# Patient Record
Sex: FEMALE | Race: WHITE | NOT HISPANIC OR LATINO | URBAN - METROPOLITAN AREA
[De-identification: names, ages, dates, MRNs, and addresses within clinical notes are randomized per-mention and may not be internally consistent; named-entity substitution may affect disease eponyms.]

---

## 2017-07-29 PROBLEM — Z00.00 ENCOUNTER FOR PREVENTIVE HEALTH EXAMINATION: Status: ACTIVE | Noted: 2017-07-29

## 2017-07-31 ENCOUNTER — OUTPATIENT (OUTPATIENT)
Dept: OUTPATIENT SERVICES | Facility: HOSPITAL | Age: 82
LOS: 1 days | End: 2017-07-31
Payer: COMMERCIAL

## 2017-07-31 ENCOUNTER — APPOINTMENT (OUTPATIENT)
Dept: MRI IMAGING | Facility: CLINIC | Age: 82
End: 2017-07-31
Payer: MEDICARE

## 2017-07-31 DIAGNOSIS — M79.604 PAIN IN RIGHT LEG: ICD-10-CM

## 2017-07-31 PROCEDURE — 73721 MRI JNT OF LWR EXTRE W/O DYE: CPT

## 2017-07-31 PROCEDURE — 73721 MRI JNT OF LWR EXTRE W/O DYE: CPT | Mod: 26,RT

## 2018-11-23 ENCOUNTER — OUTPATIENT (OUTPATIENT)
Dept: OUTPATIENT SERVICES | Facility: HOSPITAL | Age: 83
LOS: 1 days | End: 2018-11-23
Payer: COMMERCIAL

## 2018-11-23 ENCOUNTER — APPOINTMENT (OUTPATIENT)
Dept: ULTRASOUND IMAGING | Facility: IMAGING CENTER | Age: 83
End: 2018-11-23
Payer: MEDICARE

## 2018-11-23 DIAGNOSIS — Z00.8 ENCOUNTER FOR OTHER GENERAL EXAMINATION: ICD-10-CM

## 2018-11-23 PROCEDURE — 76770 US EXAM ABDO BACK WALL COMP: CPT | Mod: 26

## 2018-11-23 PROCEDURE — 76770 US EXAM ABDO BACK WALL COMP: CPT

## 2018-12-13 ENCOUNTER — APPOINTMENT (OUTPATIENT)
Dept: GERIATRICS | Facility: CLINIC | Age: 83
End: 2018-12-13
Payer: MEDICARE

## 2018-12-13 VITALS
RESPIRATION RATE: 16 BRPM | DIASTOLIC BLOOD PRESSURE: 58 MMHG | HEIGHT: 61 IN | OXYGEN SATURATION: 99 % | SYSTOLIC BLOOD PRESSURE: 130 MMHG | HEART RATE: 76 BPM

## 2018-12-13 VITALS — BODY MASS INDEX: 22.11 KG/M2 | WEIGHT: 117 LBS

## 2018-12-13 VITALS — TEMPERATURE: 98.2 F

## 2018-12-13 DIAGNOSIS — Z78.9 OTHER SPECIFIED HEALTH STATUS: ICD-10-CM

## 2018-12-13 DIAGNOSIS — Z60.2 PROBLEMS RELATED TO LIVING ALONE: ICD-10-CM

## 2018-12-13 DIAGNOSIS — Z63.4 DISAPPEARANCE AND DEATH OF FAMILY MEMBER: ICD-10-CM

## 2018-12-13 DIAGNOSIS — Z84.89 FAMILY HISTORY OF OTHER SPECIFIED CONDITIONS: ICD-10-CM

## 2018-12-13 PROCEDURE — 99205 OFFICE O/P NEW HI 60 MIN: CPT

## 2018-12-13 SDOH — SOCIAL STABILITY - SOCIAL INSECURITY: DISSAPEARANCE AND DEATH OF FAMILY MEMBER: Z63.4

## 2018-12-13 SDOH — SOCIAL STABILITY - SOCIAL INSECURITY: PROBLEMS RELATED TO LIVING ALONE: Z60.2

## 2019-01-24 ENCOUNTER — APPOINTMENT (OUTPATIENT)
Dept: ENDOCRINOLOGY | Facility: CLINIC | Age: 84
End: 2019-01-24

## 2019-01-25 ENCOUNTER — APPOINTMENT (OUTPATIENT)
Dept: GERIATRICS | Facility: CLINIC | Age: 84
End: 2019-01-25
Payer: MEDICARE

## 2019-01-25 VITALS
DIASTOLIC BLOOD PRESSURE: 50 MMHG | BODY MASS INDEX: 22.48 KG/M2 | WEIGHT: 119 LBS | SYSTOLIC BLOOD PRESSURE: 132 MMHG | HEART RATE: 90 BPM | TEMPERATURE: 97.6 F | OXYGEN SATURATION: 99 %

## 2019-01-25 DIAGNOSIS — G56.00 CARPAL TUNNEL SYNDROME, UNSPECIFIED UPPER LIMB: ICD-10-CM

## 2019-01-25 PROCEDURE — 99214 OFFICE O/P EST MOD 30 MIN: CPT

## 2019-01-25 RX ORDER — BLOOD SUGAR DIAGNOSTIC
STRIP MISCELLANEOUS
Qty: 100 | Refills: 0 | Status: DISCONTINUED | COMMUNITY
Start: 2018-06-19 | End: 2019-01-25

## 2019-01-25 RX ORDER — PEN NEEDLE, DIABETIC 32 GX 1/6"
32G X 4 MM NEEDLE, DISPOSABLE MISCELLANEOUS
Qty: 100 | Refills: 0 | Status: DISCONTINUED | COMMUNITY
Start: 2018-07-17 | End: 2019-01-25

## 2019-01-25 RX ORDER — LANCETS 28 GAUGE
EACH MISCELLANEOUS
Qty: 100 | Refills: 0 | Status: DISCONTINUED | COMMUNITY
Start: 2018-08-17 | End: 2019-01-25

## 2019-01-25 RX ORDER — CHLORTHALIDONE 25 MG/1
25 TABLET ORAL
Qty: 30 | Refills: 0 | Status: DISCONTINUED | COMMUNITY
Start: 2017-11-14 | End: 2019-01-25

## 2019-01-25 RX ORDER — GLIPIZIDE 5 MG/1
5 TABLET ORAL
Qty: 60 | Refills: 0 | Status: DISCONTINUED | COMMUNITY
Start: 2018-07-17 | End: 2019-01-25

## 2019-01-25 RX ORDER — INSULIN GLARGINE 100 [IU]/ML
100 INJECTION, SOLUTION SUBCUTANEOUS
Qty: 15 | Refills: 0 | Status: DISCONTINUED | COMMUNITY
Start: 2018-07-17 | End: 2019-01-25

## 2019-01-25 NOTE — REVIEW OF SYSTEMS
[Eyesight Problems] : eyesight problems [Loss Of Hearing] : hearing loss [As Noted in HPI] : as noted in HPI [Negative] : Heme/Lymph

## 2019-01-28 NOTE — DATA REVIEWED
[FreeTextEntry1] : EXAM:  US KIDNEYS AND BLADDER     PROCEDURE DATE:  11/23/2018       INTERPRETATION:  CLINICAL INFORMATION: Renal cysts. Follow-up.  COMPARISON: CT 3/22/2012.  TECHNIQUE: Sonography of the kidneys and bladder.   FINDINGS:  Right kidney:  9.6 cm. No renal mass, hydronephrosis or calculi.  Left kidney:  9.0 cm. No renal mass, hydronephrosis or calculi. 2 cm  simple cyst in the midpole.  Urinary bladder: Within normal limits.  IMPRESSION:   Normal renal ultrasound.\par \par EXAM:  MRI HIP WO CON RT     PROCEDURE DATE:  07/31/2017       INTERPRETATION:  EXAMINATION: MRI of the bony pelvis/right hip without  contrast  CLINICAL INFORMATION: Right leg pain  TECHNIQUE: Multiplanar, multisequential MR imaging was performed.  FINDINGS: There is no fracture or osteonecrosis. There is a physiologic  amount of bilateral hip joint fluid. There are no advanced arthritic  changes at the hips bilaterally.   There is full-thickness tearing of the anterior half of the right gluteus  medius tendon from its insertion on the right greater trochanter with  approximately 2 cm of tendon retraction. There is adjacent fluid in the  right subgluteus medius bursa communicating with fluid in the right  trochanteric bursa. There is adjacent intramuscular edema within the  distal gluteus medius and minimus muscles. There is mild insertional  gluteus minimus tendinosis on the right.  There is peritendinous edema at the left gluteus medius tendon insertion  and gluteus minimus tendon insertion without full-thickness tendon tear.  There is a small amount fluid within the left trochanteric bursa,  consistent with bursitis.  There is diverticulosis.  There are bilateral fat-containing inguinal  hernias.  IMPRESSION: Full-thickness tear of the anterior half of the right gluteus  medius tendon from its insertion on the greater trochanter with  retraction and with adjacent fluid in the right subgluteus medius and  trochanteric bursae.  Peritendinous edema at the left gluteus minimus and medius tendon  insertions with adjacent small amount of fluid within the left  trochanteric bursa.\par \par \par

## 2019-01-28 NOTE — PHYSICAL EXAM
[General Appearance - Alert] : alert [General Appearance - In No Acute Distress] : in no acute distress [Sclera] : the sclera and conjunctiva were normal [Extraocular Movements] : extraocular movements were intact [Hearing Threshold Finger Rub Not [place]] : hearing was normal [Oropharynx] : The oropharynx was normal [Neck Appearance] : the appearance of the neck was normal [Neck Cervical Mass (___cm)] : no neck mass was observed [Jugular Venous Distention Increased] : there was no jugular-venous distention [Thyroid Diffuse Enlargement] : the thyroid was not enlarged [Thyroid Nodule] : there were no palpable thyroid nodules [Auscultation Breath Sounds / Voice Sounds] : lungs were clear to auscultation bilaterally [Heart Rate And Rhythm] : heart rate was normal and rhythm regular [Heart Sounds] : normal S1 and S2 [Heart Sounds Gallop] : no gallops [Murmurs] : no murmurs [Heart Sounds Pericardial Friction Rub] : no pericardial rub [Bowel Sounds] : normal bowel sounds [Abdomen Soft] : soft [Abdomen Tenderness] : non-tender [] : no hepato-splenomegaly [Abdomen Mass (___ Cm)] : no abdominal mass palpated [Nail Clubbing] : no clubbing  or cyanosis of the fingernails [Involuntary Movements] : no involuntary movements were seen [No Focal Deficits] : no focal deficits [FreeTextEntry1] : decreased sensation to b/l hands on fingertips.  4- on bilateral  strength.  unable to perform rhomberg test due to instability/loss of balance standing.  slow to rise from chair, needs to use arms for assistance.

## 2019-01-28 NOTE — SOCIAL HISTORY
[No falls in past year] : Patient reported no falls in the past year [Canes] : kenneth [de-identified] : dtr

## 2019-01-28 NOTE — ASSESSMENT
[FreeTextEntry1] : labs from april 2017:\par cr 1.91 bun 53\par hbg 11.2\par \par 10/2018 cr 1.55, 6/2018 1.61\par ca and na, K wnl\par lfts from 5/2018: wnl\par chol 203\par \par moderate dementia/ memory loss: MMSE 14/30with serial 7's and 17/30 with world.  abnormal clock drawing. cardiovascular risk factors and timeline of reported memory loss by dtr more consistent with vascular dementia. reports never having brain imaging in past.  depression screening negative. start trial of Aricept 5mg daily. \par labs reviewed, negative for reversible causes.    discussed socialization importance, physical activity.  recommended dtr supervise medications in the pill box for adherence.\par \par neuropathy:   c/w PT/OT. possible carpel tunnel?  reports unclear if gabapentin is providing relief, consider trial off. \par \par dm2: episodes of hypoglycemia, with a1c of 5.3%  now off  lantus and glipizide.  diet controlled\par \par osteoporosis screening: strong family hx of fractures: next visit recommend  dexa. Vit D level with last labs in normal range.\par \par unsteady gait: likely due to decreased balance and frailty.  c/w PT\par \par

## 2019-01-28 NOTE — HISTORY OF PRESENT ILLNESS
[FreeTextEntry1] : 89yoF presents with her dtr Wilbert for f/u for memory loss.  pmhx of dm2, htn, hypothyroidism.\par \par reports bilateral hands neuropathy improved with gabapentin low dose daily.\par lives alone in an apt on 1 floor.\par hitesh dtr lives 1 block away has been coming twice daily to give patient her medications.\par other dtr Wilbert  in [place], son in [place].\par lived for many years in house in [place], but sold due to difficult with upkeep.\par no longer driving.\par \par dm2: last visit we stopped insulin, now off glipizide as well.  had episodes of hypoglycemia reported by family, but without symptoms when she was taking those medications. has been fine lately.  last a1c 5.3%, in oct and in may 2018 5.3%\par has been on lantus for 15 years, but has been titrating it down over the years.  now off.\par \par htn: denies lightheadedness, dizziness, shortness of breath, or palpitations.  adherent with medications\par \par \par hypothyroidism: recently increased her synthroid to 112mcg.  patient manages her own medications.  dtr just picks up from pharmacy\par \par memory loss:  dtr reports patient was doing well, then more recently noticed a decline in her memory and her "confidence".  Used to drive to shopping center and walk around. No longer.  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   main problem with medication adherence, proper use.   now dtr is coming to help with medications.\par \par two dtrs hitesh and wilbert.\par \par \par \par \par

## 2019-02-15 ENCOUNTER — RX RENEWAL (OUTPATIENT)
Age: 84
End: 2019-02-15

## 2019-03-29 ENCOUNTER — LABORATORY RESULT (OUTPATIENT)
Age: 84
End: 2019-03-29

## 2019-03-29 ENCOUNTER — APPOINTMENT (OUTPATIENT)
Dept: GERIATRICS | Facility: CLINIC | Age: 84
End: 2019-03-29
Payer: MEDICARE

## 2019-03-29 VITALS
HEART RATE: 72 BPM | BODY MASS INDEX: 22.52 KG/M2 | DIASTOLIC BLOOD PRESSURE: 56 MMHG | TEMPERATURE: 98.3 F | WEIGHT: 119.2 LBS | SYSTOLIC BLOOD PRESSURE: 160 MMHG | OXYGEN SATURATION: 98 %

## 2019-03-29 DIAGNOSIS — G56.93 UNSPECIFIED MONONEUROPATHY OF BILATERAL UPPER LIMBS: ICD-10-CM

## 2019-03-29 PROCEDURE — 99214 OFFICE O/P EST MOD 30 MIN: CPT

## 2019-03-29 RX ORDER — GABAPENTIN 100 MG/1
100 CAPSULE ORAL
Qty: 30 | Refills: 1 | Status: DISCONTINUED | COMMUNITY
Start: 2018-12-13 | End: 2019-03-29

## 2019-03-29 NOTE — REVIEW OF SYSTEMS
[Eyesight Problems] : eyesight problems [Loss Of Hearing] : hearing loss [As Noted in HPI] : as noted in HPI [Negative] : Heme/Lymph [FreeTextEntry9] : left leg pain

## 2019-03-29 NOTE — SOCIAL HISTORY
[No falls in past year] : Patient reported no falls in the past year [Canes] : kenneth [de-identified] : dtr

## 2019-03-29 NOTE — ASSESSMENT
[FreeTextEntry1] : 10/2018 cr 1.55, 6/2018 1.61\par ca and na, K wnl\par lfts from 5/2018: wnl\par chol 203\par \par moderate dementia/ memory loss: MMSE 14/30 with serial 7's and 17/30 with world.  abnormal clock drawing. cardiovascular risk factors and timeline of reported memory loss by dtr more consistent with vascular dementia. \par increase  Aricept 10mg daily. \par discussed socialization importance, physical activity.  recommended dtr supervise medications in the pill box for adherence.\par \par htn:  c/w current medications.  f/u bp at next visit.\par \par lower ext edema:  start lasix 40mg daily x 14 days,  then reassess if remains needed.  check labork today\par \par dm2: episodes of hypoglycemia, with a1c of 5.3%  now off  lantus and glipizide.  diet controlled\par \par osteoporosis screening: strong family hx of fractures: next visit will get dexa.\par \par unsteady gait: completed PT, c/w fall precautions\par \par neuropathy:  possible carpel tunnel?  no change off gabapentin, continue to monitor

## 2019-03-29 NOTE — HISTORY OF PRESENT ILLNESS
[FreeTextEntry1] : 89yoF presents with her dtr Wilbert for f/u for memory loss and complaint of lower ext edema.  pmhx of dm2, htn, hypothyroidism.\par \par \par lives alone in an apt on 1 floor.\par hitesh dtr lives 1 block away has been coming twice daily to give patient her medications.\par other dtr Wilbert  in [place], son in [place].\par no longer driving.\par \par dm2:  hx episodes of hypoglycemia, now off all meds, may 2018 5.3%\par has been on lantus for 15 years,   now off.\par \par htn: denies lightheadedness, dizziness, shortness of breath, or palpitations.  adherent with medications\par \par hypothyroidism: synthroid to 112mcg. \par \par memory loss:  dtr is coming to help with medications, looking into .  last visited started on aricept.\par \par two dtrs hitesh and wilbert.\par \par \par \par \par

## 2019-03-29 NOTE — PHYSICAL EXAM
[General Appearance - Alert] : alert [General Appearance - In No Acute Distress] : in no acute distress [Sclera] : the sclera and conjunctiva were normal [Extraocular Movements] : extraocular movements were intact [Hearing Threshold Finger Rub Not [place]] : hearing was normal [Neck Appearance] : the appearance of the neck was normal [Auscultation Breath Sounds / Voice Sounds] : lungs were clear to auscultation bilaterally [Heart Rate And Rhythm] : heart rate was normal and rhythm regular [Heart Sounds] : normal S1 and S2 [Bowel Sounds] : normal bowel sounds [Abdomen Soft] : soft [Abdomen Tenderness] : non-tender [Abdomen Mass (___ Cm)] : no abdominal mass palpated [Nail Clubbing] : no clubbing  or cyanosis of the fingernails [Involuntary Movements] : no involuntary movements were seen [No Focal Deficits] : no focal deficits [Outer Ear] : the ears and nose were normal in appearance [] : no rash [Affect] : the affect was normal [Mood] : the mood was normal [Musculoskeletal - Swelling] : no joint swelling seen [FreeTextEntry1] : no tenderness to palpation of left hip or knee. no effusion or swelling., shuffling gait

## 2019-04-01 ENCOUNTER — RESULT REVIEW (OUTPATIENT)
Age: 84
End: 2019-04-01

## 2019-04-01 DIAGNOSIS — Z87.440 PERSONAL HISTORY OF URINARY (TRACT) INFECTIONS: ICD-10-CM

## 2019-04-01 LAB
25(OH)D3 SERPL-MCNC: 36 NG/ML
ALBUMIN SERPL ELPH-MCNC: 4.1 G/DL
ALP BLD-CCNC: 59 U/L
ALT SERPL-CCNC: 13 U/L
ANION GAP SERPL CALC-SCNC: 13 MMOL/L
APPEARANCE: ABNORMAL
AST SERPL-CCNC: 22 U/L
BASOPHILS # BLD AUTO: 0.16 K/UL
BASOPHILS NFR BLD AUTO: 1.2 %
BILIRUB SERPL-MCNC: <0.2 MG/DL
BILIRUBIN URINE: NEGATIVE
BLOOD URINE: NORMAL
BUN SERPL-MCNC: 43 MG/DL
CALCIUM SERPL-MCNC: 9.6 MG/DL
CHLORIDE SERPL-SCNC: 104 MMOL/L
CO2 SERPL-SCNC: 24 MMOL/L
COLOR: NORMAL
CREAT SERPL-MCNC: 1.45 MG/DL
EOSINOPHIL # BLD AUTO: 0.43 K/UL
EOSINOPHIL NFR BLD AUTO: 3.3 %
ESTIMATED AVERAGE GLUCOSE: 120 MG/DL
FOLATE SERPL-MCNC: >20 NG/ML
GLUCOSE QUALITATIVE U: NEGATIVE
GLUCOSE SERPL-MCNC: 88 MG/DL
HBA1C MFR BLD HPLC: 5.8 %
HCT VFR BLD CALC: 31.4 %
HGB BLD-MCNC: 9.6 G/DL
IMM GRANULOCYTES NFR BLD AUTO: 0.3 %
IRON SATN MFR SERPL: 11 %
IRON SERPL-MCNC: 29 UG/DL
KETONES URINE: NEGATIVE
LEUKOCYTE ESTERASE URINE: ABNORMAL
LYMPHOCYTES # BLD AUTO: 1.88 K/UL
LYMPHOCYTES NFR BLD AUTO: 14.6 %
MAGNESIUM SERPL-MCNC: 2.1 MG/DL
MAN DIFF?: NORMAL
MCHC RBC-ENTMCNC: 29.6 PG
MCHC RBC-ENTMCNC: 30.6 GM/DL
MCV RBC AUTO: 96.9 FL
MONOCYTES # BLD AUTO: 0.96 K/UL
MONOCYTES NFR BLD AUTO: 7.5 %
NEUTROPHILS # BLD AUTO: 9.39 K/UL
NEUTROPHILS NFR BLD AUTO: 73.1 %
NITRITE URINE: NEGATIVE
PH URINE: 7.5
PLATELET # BLD AUTO: 370 K/UL
POTASSIUM SERPL-SCNC: 5.1 MMOL/L
PROT SERPL-MCNC: 7.2 G/DL
PROTEIN URINE: ABNORMAL
RBC # BLD: 3.24 M/UL
RBC # FLD: 13 %
SODIUM SERPL-SCNC: 141 MMOL/L
SPECIFIC GRAVITY URINE: 1.01
TIBC SERPL-MCNC: 274 UG/DL
TSH SERPL-ACNC: 0.94 UIU/ML
UIBC SERPL-MCNC: 245 UG/DL
UROBILINOGEN URINE: NORMAL
VIT B12 SERPL-MCNC: 836 PG/ML
WBC # FLD AUTO: 12.86 K/UL

## 2019-04-25 ENCOUNTER — EMERGENCY (EMERGENCY)
Facility: HOSPITAL | Age: 84
LOS: 1 days | Discharge: HOME | End: 2019-04-25
Attending: EMERGENCY MEDICINE
Payer: COMMERCIAL

## 2019-04-25 VITALS
HEART RATE: 90 BPM | OXYGEN SATURATION: 99 % | SYSTOLIC BLOOD PRESSURE: 182 MMHG | DIASTOLIC BLOOD PRESSURE: 78 MMHG | TEMPERATURE: 98 F | RESPIRATION RATE: 17 BRPM

## 2019-04-25 VITALS
TEMPERATURE: 98 F | WEIGHT: 115.96 LBS | HEIGHT: 63 IN | RESPIRATION RATE: 19 BRPM | OXYGEN SATURATION: 99 % | DIASTOLIC BLOOD PRESSURE: 97 MMHG | SYSTOLIC BLOOD PRESSURE: 174 MMHG | HEART RATE: 82 BPM

## 2019-04-25 PROCEDURE — 99283 EMERGENCY DEPT VISIT LOW MDM: CPT

## 2019-04-25 PROCEDURE — 93005 ELECTROCARDIOGRAM TRACING: CPT

## 2019-04-25 PROCEDURE — 93010 ELECTROCARDIOGRAM REPORT: CPT

## 2019-04-25 PROCEDURE — 99284 EMERGENCY DEPT VISIT MOD MDM: CPT | Mod: 25

## 2019-04-25 NOTE — ED PROVIDER NOTE - NSFOLLOWUPINSTRUCTIONS_ED_ALL_ED_FT
1. Please follow up with your doctor tomorrow and let them know you were seen in the ED for chest pain.  2. Please return to the ED should you have any new or worsening symptoms or have any new concerns as discussed.  3. Read all attached.

## 2019-04-25 NOTE — ED ADULT NURSE NOTE - OBJECTIVE STATEMENT
Patient   is  alert   and  oriented x3.   Color is  good  and skin  warm  to  touch.   She  is  c/o chest  discomfort.   She denies  nausea,  SOB  or  radiation  to  face  or  arms.   No  diaphoresis   noted.

## 2019-04-25 NOTE — ED PROVIDER NOTE - ATTENDING CONTRIBUTION TO CARE
89F, pmh hypothyroidism, htn, presents s/p episode of chest pain while performing physical therapy. Pt was outstretching bilat arms and raising them when felt onset of mid-sternal pain, non-pleuritic, non-radiating, non-exertional, mild. Pt lowered arms and sx resolved after period of minutes. No associated, n/v, sob, diaphoresis, arm or jaw discomfort. Pt without any recent leg swelling or pain, NAVARRO, other episodes of chest pain. Pt was seen by doctor at PT who recommended she comes to ED. Denies any f/c, cough, congestion, or any other complaints.    PE: Well appearing elderly female in NAD, NCAT, MMM, Trachea midline, Normal conjunctiva, lungs CTAB, S1/S2 RRR, Normal perfusion, 2+ radial pulses bilat, Abdomen Soft, NTND, No rebound/guarding, No LE edema, No deformity of extremities, No rashes,  No focal motor or sensory deficits.     Pt appears well. Is hypertensive, but asymptomatic. Pain was only while moving arms, then quickly subsided. No associated sx. EKG without acute ischemic changes. Given that pain was with movement of arms, no associated sx, no sig cardiac hx, I believe the likelihood of this being primary cardiac in nature is low. I also believe the likelihood of PE or other acute pulm process is extremely low. Dr Rodrigues and I had an extensive discussion with patient and family at bedside regarding risks/benefits of evaluation for acs including labwork, cxr, and risks of false positives and potential implications of this. Risks of missed MI including disability or death also discussed. Family and patient wish for further testing to NOT be performed at this time. Strict return precatuiosn d/w patient and family. An opportunity to ask questions was provided and all answered. Pt to f/u with pmd, to contact tomorrow. - Angel Walker MD

## 2019-04-25 NOTE — ED PROVIDER NOTE - CLINICAL SUMMARY MEDICAL DECISION MAKING FREE TEXT BOX
Shanell Rodrigues, DO: 89F with likely MSK pain low risk for cardiac disease. Had extensive discussion with patient and daughter at bedside - including risk of false positive. Patient is DNR and would not want not to have invasive testing if enzymes +. Patient has capacity, understands risk of not performing cardiac testing in the ED including disability & death and was able to verbalize this back to us. Patient recommended to follow up with PMD tomorrow and strict return precautions reviewed.

## 2019-04-25 NOTE — ED PROVIDER NOTE - OBJECTIVE STATEMENT
Shanell Rodrigues DO: Shanell Rodrigues, DO: 89F with hx of hypothyroidism and HTN here for chest pain. Pain is non-pleuritic, non-radiating, non-exertional & started while outstretching b/l arms in front of her during routine physical therapy. No nausea/vomiting, diaphoresis, shortness of breath. Pain lasted 5 minutes. Seen by doctor on site & patient had vitals in which patient was tachycardic and hypertensive. Patient missed AM meds.

## 2019-04-25 NOTE — ED ADULT NURSE NOTE - NSIMPLEMENTINTERV_GEN_ALL_ED
Implemented All Fall with Harm Risk Interventions:  [place] to call system. Call bell, personal items and telephone within reach. Instruct patient to call for assistance. Room bathroom lighting operational. Non-slip footwear when patient is off stretcher. Physically safe environment: no spills, clutter or unnecessary equipment. Stretcher in lowest position, wheels locked, appropriate side rails in place. Provide visual cue, wrist band, yellow gown, etc. Monitor gait and stability. Monitor for mental status changes and reorient to person, place, and time. Review medications for side effects contributing to fall risk. Reinforce activity limits and safety measures with patient and family. Provide visual clues: red socks.

## 2019-05-17 ENCOUNTER — APPOINTMENT (OUTPATIENT)
Dept: ENDOCRINOLOGY | Facility: CLINIC | Age: 84
End: 2019-05-17

## 2019-06-06 ENCOUNTER — APPOINTMENT (OUTPATIENT)
Dept: GERIATRICS | Facility: CLINIC | Age: 84
End: 2019-06-06
Payer: MEDICARE

## 2019-06-06 VITALS
WEIGHT: 120.38 LBS | TEMPERATURE: 98.6 F | HEART RATE: 80 BPM | RESPIRATION RATE: 14 BRPM | OXYGEN SATURATION: 97 % | BODY MASS INDEX: 22.73 KG/M2 | HEIGHT: 61 IN | DIASTOLIC BLOOD PRESSURE: 78 MMHG | SYSTOLIC BLOOD PRESSURE: 140 MMHG

## 2019-06-06 DIAGNOSIS — R21 RASH AND OTHER NONSPECIFIC SKIN ERUPTION: ICD-10-CM

## 2019-06-06 DIAGNOSIS — Z79.4 TYPE 2 DIABETES MELLITUS WITH UNSPECIFIED COMPLICATIONS: ICD-10-CM

## 2019-06-06 DIAGNOSIS — Z13.820 ENCOUNTER FOR SCREENING FOR OSTEOPOROSIS: ICD-10-CM

## 2019-06-06 DIAGNOSIS — E11.8 TYPE 2 DIABETES MELLITUS WITH UNSPECIFIED COMPLICATIONS: ICD-10-CM

## 2019-06-06 PROCEDURE — 99214 OFFICE O/P EST MOD 30 MIN: CPT

## 2019-06-06 RX ORDER — FUROSEMIDE 40 MG/1
40 TABLET ORAL
Qty: 30 | Refills: 1 | Status: DISCONTINUED | COMMUNITY
Start: 2019-03-29 | End: 2019-06-06

## 2019-06-06 RX ORDER — HYDROCORTISONE 25 MG/G
2.5 CREAM TOPICAL TWICE DAILY
Qty: 1 | Refills: 2 | Status: DISCONTINUED | COMMUNITY
Start: 2019-01-28 | End: 2019-06-06

## 2019-06-06 RX ORDER — L. ACIDOPHILUS/L.BULGARICUS 1MM CELL
TABLET ORAL
Qty: 10 | Refills: 0 | Status: DISCONTINUED | COMMUNITY
Start: 2019-04-01 | End: 2019-06-06

## 2019-06-06 RX ORDER — ASPIRIN 81 MG/1
81 TABLET ORAL DAILY
Refills: 0 | Status: DISCONTINUED | COMMUNITY
Start: 2018-12-13 | End: 2019-06-06

## 2019-06-06 RX ORDER — CIPROFLOXACIN HYDROCHLORIDE 250 MG/1
250 TABLET, FILM COATED ORAL
Qty: 10 | Refills: 0 | Status: DISCONTINUED | COMMUNITY
Start: 2019-04-01 | End: 2019-06-06

## 2019-06-06 NOTE — PHYSICAL EXAM
[General Appearance - Alert] : alert [Sclera] : the sclera and conjunctiva were normal [General Appearance - In No Acute Distress] : in no acute distress [Outer Ear] : the ears and nose were normal in appearance [Extraocular Movements] : extraocular movements were intact [Hearing Threshold Finger Rub Not [place]] : hearing was normal [Neck Appearance] : the appearance of the neck was normal [Auscultation Breath Sounds / Voice Sounds] : lungs were clear to auscultation bilaterally [Heart Rate And Rhythm] : heart rate was normal and rhythm regular [Heart Sounds] : normal S1 and S2 [Bowel Sounds] : normal bowel sounds [Abdomen Tenderness] : non-tender [Abdomen Soft] : soft [Abdomen Mass (___ Cm)] : no abdominal mass palpated [Cervical Lymph Nodes Enlarged Posterior Bilaterally] : posterior cervical [Cervical Lymph Nodes Enlarged Anterior Bilaterally] : anterior cervical [No Spinal Tenderness] : no spinal tenderness [Supraclavicular Lymph Nodes Enlarged Bilaterally] : supraclavicular [Nail Clubbing] : no clubbing  or cyanosis of the fingernails [Involuntary Movements] : no involuntary movements were seen [Musculoskeletal - Swelling] : no joint swelling seen [No Focal Deficits] : no focal deficits [] : no rash [Affect] : the affect was normal [Mood] : the mood was normal [FreeTextEntry1] : kyphosis

## 2019-06-06 NOTE — HISTORY OF PRESENT ILLNESS
[FreeTextEntry1] : 89yoF presents with her dtr hitesh for f/u for memory loss.\par  pmhx of dm2 (diet controlled) htn, hypothyroidism.\par \par she is without acute complaints today.\par lives alone in an apt on 1 floor.  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  no longer driving.\par \par physical therapy, throwing ball developed pain across chest, went to ER, negative cardiac etiology.  Dr. Morejon internists 2 weeks ago with everything stable.\par \par dm2: now diet controlled\par \par htn: denies lightheadedness, dizziness, shortness of breath, or palpitations.  adherent with medications\par \par hypothyroidism: synthroid to 112mcg. \par two dtrs hitesh and lynda. other dtr Lynda  in [place], son in [place].\par \par \par \par

## 2019-06-06 NOTE — ASSESSMENT
[FreeTextEntry1] : moderate dementia: MMSE 14/30 with serial 7's and 17/30 with world.  abnormal clock drawing. cardiovascular risk factors and timeline of reported memory loss by dtr more consistent with vascular dementia. \par c/w Aricept 10mg daily. \par c/w socialization physical activity. supervise medications in the pill box for adherence.\par \par htn:  c/w current medications.  \par dm2: episodes of hypoglycemia, with a1c of 5.3%  now off  lantus and glipizide.  diet controlled\par osteoporosis screening: strong family hx of fractures: next visit f/u hx of dexa.\par unsteady gait: has had previous PT, c/w fall precautions\par neuropathy:  possible carpel tunnel?  remains off gabapentin with stable symptoms.\par \par 10/2018 cr 1.55, 6/2018 1.61\par lfts from 5/2018: wnl\par chol 203\par \par f/u oct

## 2019-07-29 ENCOUNTER — APPOINTMENT (OUTPATIENT)
Dept: GERIATRICS | Facility: CLINIC | Age: 84
End: 2019-07-29
Payer: MEDICARE

## 2019-07-29 VITALS
OXYGEN SATURATION: 97 % | SYSTOLIC BLOOD PRESSURE: 120 MMHG | TEMPERATURE: 98.6 F | HEART RATE: 77 BPM | RESPIRATION RATE: 14 BRPM | WEIGHT: 121.5 LBS | HEIGHT: 61 IN | BODY MASS INDEX: 22.94 KG/M2 | DIASTOLIC BLOOD PRESSURE: 78 MMHG

## 2019-07-29 PROCEDURE — 99214 OFFICE O/P EST MOD 30 MIN: CPT | Mod: GC

## 2019-07-29 NOTE — SOCIAL HISTORY
[One fall no injury in past year] : Patient reported one fall in the past year without injury [Canes] : kenneth

## 2019-07-29 NOTE — HISTORY OF PRESENT ILLNESS
[Memory Lapses Or Loss] : worsened memory impairment [FreeTextEntry1] : 89 yo F lives alone in an apartment, daughter leaves nearby, presents with her dtr hitesh. PMH of DM2 (diet controlled) HTN, vascular dementia on (donepezil) and hypothyroidism.\par \par Pt had recent mechanical fall at home and was recently treated for a UTI. Family reported had possible triggered by recent infectious process, pt had small laceration on left arm and small bruise under left eye. Family also noted worsening change in mental status as well with recent infection, currently not at baseline as per daughter. Pt did not recall visiting doctor recently for UTI. \par \par Denies lightheadedness, dizziness, shortness of breath, or palpitations. adherent with medication. \par \par In the summer has not been going to senior center anymore. Is able to self feed, get dressed and shower by herself. Daughter helps with groceries and helps with pill box.

## 2019-07-29 NOTE — PHYSICAL EXAM
[General Appearance - Alert] : alert [General Appearance - In No Acute Distress] : in no acute distress [Sclera] : the sclera and conjunctiva were normal [PERRL With Normal Accommodation] : pupils were equal in size, round, and reactive to light [Normal Oral Mucosa] : normal oral mucosa [Extraocular Movements] : extraocular movements were intact [No Oral Pallor] : no oral pallor [Neck Appearance] : the appearance of the neck was normal [Neck Cervical Mass (___cm)] : no neck mass was observed [Jugular Venous Distention Increased] : there was no jugular-venous distention [] : no respiratory distress [Respiration, Rhythm And Depth] : normal respiratory rhythm and effort [Exaggerated Use Of Accessory Muscles For Inspiration] : no accessory muscle use [Apical Impulse] : the apical impulse was normal [Heart Rate And Rhythm] : heart rate was normal and rhythm regular [Heart Sounds] : normal S1 and S2 [Bowel Sounds] : normal bowel sounds [Abdomen Soft] : soft [Abdomen Tenderness] : non-tender [Abnormal Walk] : normal gait [Musculoskeletal - Swelling] : no joint swelling seen [Nail Clubbing] : no clubbing  or cyanosis of the fingernails [No Focal Deficits] : no focal deficits [FreeTextEntry1] : ecchymotic area under left eye, left arm laceration with no signs of infection

## 2019-08-04 ENCOUNTER — FORM ENCOUNTER (OUTPATIENT)
Age: 84
End: 2019-08-04

## 2019-08-05 ENCOUNTER — OUTPATIENT (OUTPATIENT)
Dept: OUTPATIENT SERVICES | Facility: HOSPITAL | Age: 84
LOS: 1 days | End: 2019-08-05
Payer: COMMERCIAL

## 2019-08-05 ENCOUNTER — APPOINTMENT (OUTPATIENT)
Dept: RADIOLOGY | Facility: IMAGING CENTER | Age: 84
End: 2019-08-05
Payer: COMMERCIAL

## 2019-08-05 DIAGNOSIS — Z13.820 ENCOUNTER FOR SCREENING FOR OSTEOPOROSIS: ICD-10-CM

## 2019-08-05 PROCEDURE — 77080 DXA BONE DENSITY AXIAL: CPT

## 2019-08-05 PROCEDURE — 77080 DXA BONE DENSITY AXIAL: CPT | Mod: 26

## 2019-08-08 ENCOUNTER — OTHER (OUTPATIENT)
Age: 84
End: 2019-08-08

## 2019-08-26 ENCOUNTER — APPOINTMENT (OUTPATIENT)
Dept: GERIATRICS | Facility: CLINIC | Age: 84
End: 2019-08-26
Payer: MEDICARE

## 2019-08-26 VITALS
HEART RATE: 81 BPM | DIASTOLIC BLOOD PRESSURE: 80 MMHG | OXYGEN SATURATION: 97 % | BODY MASS INDEX: 23.6 KG/M2 | SYSTOLIC BLOOD PRESSURE: 130 MMHG | HEIGHT: 61 IN | WEIGHT: 125 LBS | RESPIRATION RATE: 15 BRPM | TEMPERATURE: 98.6 F

## 2019-08-26 PROCEDURE — 99214 OFFICE O/P EST MOD 30 MIN: CPT | Mod: GC

## 2019-08-28 ENCOUNTER — LABORATORY RESULT (OUTPATIENT)
Age: 84
End: 2019-08-28

## 2019-08-28 LAB
25(OH)D3 SERPL-MCNC: 24 NG/ML
ALBUMIN SERPL ELPH-MCNC: 4.2 G/DL
ALP BLD-CCNC: 68 U/L
ALT SERPL-CCNC: 10 U/L
ANION GAP SERPL CALC-SCNC: 14 MMOL/L
AST SERPL-CCNC: 19 U/L
BASOPHILS # BLD AUTO: 0.11 K/UL
BASOPHILS NFR BLD AUTO: 0.9 %
BILIRUB SERPL-MCNC: <0.2 MG/DL
BUN SERPL-MCNC: 47 MG/DL
CALCIUM SERPL-MCNC: 10.4 MG/DL
CHLORIDE SERPL-SCNC: 103 MMOL/L
CO2 SERPL-SCNC: 24 MMOL/L
CREAT SERPL-MCNC: 1.6 MG/DL
EOSINOPHIL # BLD AUTO: 0.31 K/UL
EOSINOPHIL NFR BLD AUTO: 2.4 %
ESTIMATED AVERAGE GLUCOSE: 114 MG/DL
FERRITIN SERPL-MCNC: 118 NG/ML
FOLATE SERPL-MCNC: >20 NG/ML
GLUCOSE SERPL-MCNC: 122 MG/DL
HBA1C MFR BLD HPLC: 5.6 %
HCT VFR BLD CALC: 30.4 %
HGB BLD-MCNC: 9.7 G/DL
IMM GRANULOCYTES NFR BLD AUTO: 0.4 %
IRON SATN MFR SERPL: 8 %
IRON SERPL-MCNC: 26 UG/DL
LYMPHOCYTES # BLD AUTO: 1.79 K/UL
LYMPHOCYTES NFR BLD AUTO: 13.9 %
MAN DIFF?: NORMAL
MCHC RBC-ENTMCNC: 30.6 PG
MCHC RBC-ENTMCNC: 31.9 GM/DL
MCV RBC AUTO: 95.9 FL
MONOCYTES # BLD AUTO: 0.83 K/UL
MONOCYTES NFR BLD AUTO: 6.4 %
NEUTROPHILS # BLD AUTO: 9.81 K/UL
NEUTROPHILS NFR BLD AUTO: 76 %
PLATELET # BLD AUTO: 380 K/UL
POTASSIUM SERPL-SCNC: 4.7 MMOL/L
PROT SERPL-MCNC: 7.7 G/DL
RBC # BLD: 3.17 M/UL
RBC # FLD: 13.3 %
SODIUM SERPL-SCNC: 141 MMOL/L
TIBC SERPL-MCNC: 307 UG/DL
UIBC SERPL-MCNC: 281 UG/DL
URATE SERPL-MCNC: 6.7 MG/DL
VIT B12 SERPL-MCNC: 651 PG/ML
WBC # FLD AUTO: 12.9 K/UL

## 2019-08-28 RX ORDER — MEMANTINE HYDROCHLORIDE AND DONEPEZIL HYDROCHLORIDE 21; 10 MG/1; MG/1
21-10 CAPSULE ORAL DAILY
Qty: 30 | Refills: 0 | Status: DISCONTINUED | COMMUNITY
Start: 2019-08-26 | End: 2019-08-28

## 2019-09-03 ENCOUNTER — RX RENEWAL (OUTPATIENT)
Age: 84
End: 2019-09-03

## 2019-09-04 ENCOUNTER — RESULT REVIEW (OUTPATIENT)
Age: 84
End: 2019-09-04

## 2019-09-04 LAB
APPEARANCE: CLEAR
BILIRUBIN URINE: NEGATIVE
BLOOD URINE: NEGATIVE
COLOR: NORMAL
CREAT SPEC-SCNC: 22 MG/DL
CREAT/PROT UR: 1.6 RATIO
GLUCOSE QUALITATIVE U: NEGATIVE
KETONES URINE: NEGATIVE
LEUKOCYTE ESTERASE URINE: ABNORMAL
NITRITE URINE: NEGATIVE
PH URINE: 6
PROT UR-MCNC: 36 MG/DL
PROTEIN URINE: ABNORMAL
SPECIFIC GRAVITY URINE: 1.01
UROBILINOGEN URINE: NORMAL

## 2019-09-04 NOTE — ASSESSMENT
[FreeTextEntry1] : 89 year old female with hx of DM2 (diet controlled) HTN, vascular dementia on (donepezil) and hypothyroidism presents for followup.

## 2019-09-04 NOTE — PHYSICAL EXAM
[General Appearance - Alert] : alert [General Appearance - Well Developed] : well developed [General Appearance - In No Acute Distress] : in no acute distress [General Appearance - Well Nourished] : well nourished [General Appearance - Well-Appearing] : healthy appearing [Sclera] : the sclera and conjunctiva were normal [PERRL With Normal Accommodation] : pupils were equal in size, round, and reactive to light [Extraocular Movements] : extraocular movements were intact [Neck Appearance] : the appearance of the neck was normal [Jugular Venous Distention Increased] : there was no jugular-venous distention [] : no respiratory distress [Respiration, Rhythm And Depth] : normal respiratory rhythm and effort [Exaggerated Use Of Accessory Muscles For Inspiration] : no accessory muscle use [Auscultation Breath Sounds / Voice Sounds] : lungs were clear to auscultation bilaterally [Heart Rate And Rhythm] : heart rate was normal and rhythm regular [Heart Sounds] : normal S1 and S2 [Abdomen Soft] : soft [Abdomen Tenderness] : non-tender [Bowel Sounds] : normal bowel sounds [No Spinal Tenderness] : no spinal tenderness [Abnormal Walk] : normal gait [Nail Clubbing] : no clubbing  or cyanosis of the fingernails [Musculoskeletal - Swelling] : no joint swelling seen [Involuntary Movements] : no involuntary movements were seen [No Focal Deficits] : no focal deficits [Impaired Insight] : insight and judgment were intact [Mood] : the mood was normal [Affect] : the affect was normal [FreeTextEntry1] : trace to 1+ pitting edema

## 2019-09-04 NOTE — HISTORY OF PRESENT ILLNESS
[FreeTextEntry1] : 89 year old female with hx of DM2 (diet controlled) HTN, vascular dementia on (donepezil) and hypothyroidism presents for followup. Accompanied by Daughter. \par \par Patient has been tolerating newly prescribed medication Namenda at last visit; denies any side effects. Per daughter and patient, she has not had much change in her memory since starting Namenda. 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 \par Today, denies any acute complaints.

## 2019-09-04 NOTE — REVIEW OF SYSTEMS
[Negative] : Neurological [FreeTextEntry8] : no urinary symptoms. however, daughter is concerned that patient may have recurrent UTI as she was asymptomatic in the past as well

## 2019-10-10 ENCOUNTER — APPOINTMENT (OUTPATIENT)
Dept: GERIATRICS | Facility: CLINIC | Age: 84
End: 2019-10-10

## 2019-10-21 ENCOUNTER — RX RENEWAL (OUTPATIENT)
Age: 84
End: 2019-10-21

## 2019-10-25 ENCOUNTER — RX RENEWAL (OUTPATIENT)
Age: 84
End: 2019-10-25

## 2019-11-27 ENCOUNTER — RX RENEWAL (OUTPATIENT)
Age: 84
End: 2019-11-27

## 2019-12-06 ENCOUNTER — APPOINTMENT (OUTPATIENT)
Dept: GERIATRICS | Facility: CLINIC | Age: 84
End: 2019-12-06
Payer: MEDICARE

## 2019-12-06 VITALS
DIASTOLIC BLOOD PRESSURE: 62 MMHG | WEIGHT: 126.8 LBS | HEART RATE: 72 BPM | BODY MASS INDEX: 23.94 KG/M2 | RESPIRATION RATE: 15 BRPM | OXYGEN SATURATION: 98 % | TEMPERATURE: 97.8 F | HEIGHT: 61 IN | SYSTOLIC BLOOD PRESSURE: 120 MMHG

## 2019-12-06 PROCEDURE — 99214 OFFICE O/P EST MOD 30 MIN: CPT

## 2019-12-06 RX ORDER — SULFAMETHOXAZOLE AND TRIMETHOPRIM 400; 80 MG/1; MG/1
400-80 TABLET ORAL TWICE DAILY
Qty: 6 | Refills: 0 | Status: DISCONTINUED | COMMUNITY
Start: 2019-09-04 | End: 2019-12-06

## 2019-12-06 NOTE — PHYSICAL EXAM
[General Appearance - Alert] : alert [General Appearance - In No Acute Distress] : in no acute distress [General Appearance - Well Nourished] : well nourished [General Appearance - Well Developed] : well developed [Sclera] : the sclera and conjunctiva were normal [General Appearance - Well-Appearing] : healthy appearing [Extraocular Movements] : extraocular movements were intact [Neck Appearance] : the appearance of the neck was normal [] : no respiratory distress [Respiration, Rhythm And Depth] : normal respiratory rhythm and effort [Exaggerated Use Of Accessory Muscles For Inspiration] : no accessory muscle use [Auscultation Breath Sounds / Voice Sounds] : lungs were clear to auscultation bilaterally [Heart Sounds] : normal S1 and S2 [Heart Rate And Rhythm] : heart rate was normal and rhythm regular [Bowel Sounds] : normal bowel sounds [Abdomen Soft] : soft [Abdomen Tenderness] : non-tender [No Spinal Tenderness] : no spinal tenderness [Abnormal Walk] : normal gait [Nail Clubbing] : no clubbing  or cyanosis of the fingernails [Involuntary Movements] : no involuntary movements were seen [Musculoskeletal - Swelling] : no joint swelling seen [No Focal Deficits] : no focal deficits [Impaired Insight] : insight and judgment were intact [Mood] : the mood was normal [Affect] : the affect was normal [Total Score ___ / 30] : the patient achieved a score of [unfilled] /30 [No Oral Pallor] : no oral pallor [No Oral Cyanosis] : no oral cyanosis [Edema] : there was no peripheral edema [Skin Color & Pigmentation] : normal skin color and pigmentation

## 2019-12-06 NOTE — HISTORY OF PRESENT ILLNESS
[Moderate] : Stage: Moderate [Sleep Disturbances] : denies sleep disturbances [Stable] : Status: Stable [] : denies wandering [FreeTextEntry1] : 89 year old female with hx of DM2 (diet controlled) HTN, vascular dementia on (donepezil) and hypothyroidism presents for followup. Accompanied by Daughter Gin.\par \par Dresses and showers herself.  still wearing incontinence garments\par aide 2 days a week.  Dtr helps with groceries and pillbox.\par still makes breakfast, lunch is delivered, and dinner with family.\par denies recent falls.\par  [de-identified] : increased confusion with people, including no longer recognizing son\par confuses dtr for night time caregiver\par

## 2020-01-20 NOTE — ED ADULT TRIAGE NOTE - MODE OF ARRIVAL
Left generic voicemail for patient to return call to clinic. When patient returns call, will assist in scheduling a Cystoscopy with Dr. Yuan.    Chelsey De Leon LPN   Walk in

## 2020-05-01 ENCOUNTER — APPOINTMENT (OUTPATIENT)
Dept: GERIATRICS | Facility: CLINIC | Age: 85
End: 2020-05-01

## 2020-05-22 ENCOUNTER — APPOINTMENT (OUTPATIENT)
Dept: GERIATRICS | Facility: CLINIC | Age: 85
End: 2020-05-22

## 2020-05-26 ENCOUNTER — RX RENEWAL (OUTPATIENT)
Age: 85
End: 2020-05-26

## 2020-08-07 ENCOUNTER — APPOINTMENT (OUTPATIENT)
Dept: GERIATRICS | Facility: CLINIC | Age: 85
End: 2020-08-07
Payer: MEDICARE

## 2020-08-07 VITALS
DIASTOLIC BLOOD PRESSURE: 50 MMHG | RESPIRATION RATE: 16 BRPM | HEART RATE: 77 BPM | OXYGEN SATURATION: 99 % | TEMPERATURE: 97 F | HEIGHT: 61 IN | BODY MASS INDEX: 21.79 KG/M2 | WEIGHT: 115.4 LBS | SYSTOLIC BLOOD PRESSURE: 162 MMHG

## 2020-08-07 PROCEDURE — 99483 ASSMT & CARE PLN PT COG IMP: CPT

## 2020-08-07 RX ORDER — FUROSEMIDE 40 MG/1
40 TABLET ORAL DAILY
Qty: 30 | Refills: 3 | Status: DISCONTINUED | COMMUNITY
Start: 2019-10-25 | End: 2020-08-07

## 2020-09-08 ENCOUNTER — NON-APPOINTMENT (OUTPATIENT)
Age: 85
End: 2020-09-08

## 2020-09-09 ENCOUNTER — RX RENEWAL (OUTPATIENT)
Age: 85
End: 2020-09-09

## 2020-09-25 ENCOUNTER — RX RENEWAL (OUTPATIENT)
Age: 85
End: 2020-09-25

## 2020-11-03 NOTE — ED ADULT NURSE NOTE - CHPI ED NUR SYMPTOMS NEG
What Type Of Note Output Would You Prefer (Optional)?: Bullet Format How Severe Is Your Skin Lesion?: moderate Has Your Skin Lesion Been Treated?: not been treated Is This A New Presentation, Or A Follow-Up?: Skin Lesion no congestion/no diaphoresis/no nausea/no back pain/no vomiting/no shortness of breath/no syncope

## 2020-11-06 ENCOUNTER — APPOINTMENT (OUTPATIENT)
Dept: GERIATRICS | Facility: CLINIC | Age: 85
End: 2020-11-06
Payer: MEDICARE

## 2020-11-06 VITALS
SYSTOLIC BLOOD PRESSURE: 142 MMHG | HEIGHT: 61 IN | WEIGHT: 116 LBS | TEMPERATURE: 97.6 F | HEART RATE: 69 BPM | DIASTOLIC BLOOD PRESSURE: 62 MMHG | RESPIRATION RATE: 16 BRPM | BODY MASS INDEX: 21.9 KG/M2 | OXYGEN SATURATION: 99 %

## 2020-11-06 DIAGNOSIS — G47.9 SLEEP DISORDER, UNSPECIFIED: ICD-10-CM

## 2020-11-06 PROCEDURE — 99214 OFFICE O/P EST MOD 30 MIN: CPT

## 2020-11-06 PROCEDURE — 99072 ADDL SUPL MATRL&STAF TM PHE: CPT

## 2020-11-06 NOTE — HISTORY OF PRESENT ILLNESS
[Severe] : Stage: Severe [Stable] : Status: Stable [Memory Lapses Or Loss] : worsened memory impairment [Patient Observed To Be Agitated] : denies agitation [Hostility Toward Caregivers] : denies aggression [Sleep Disturbances] : worsened sleep disturbances [] : stable wandering [FreeTextEntry1] : unable due to severe dementia

## 2020-11-06 NOTE — REVIEW OF SYSTEMS
[Fever] : no fever [Cough] : no cough [Constipation] : no constipation [Diarrhea] : no diarrhea [Dysuria] : no dysuria

## 2020-11-06 NOTE — PHYSICAL EXAM
[General Appearance - Alert] : alert [General Appearance - In No Acute Distress] : in no acute distress [Sclera] : the sclera and conjunctiva were normal [Extraocular Movements] : extraocular movements were intact [No Oral Pallor] : no oral pallor [Neck Appearance] : the appearance of the neck was normal [Respiration, Rhythm And Depth] : normal respiratory rhythm and effort [Exaggerated Use Of Accessory Muscles For Inspiration] : no accessory muscle use [Heart Sounds] : normal S1 and S2 [Bowel Sounds] : normal bowel sounds [Abdomen Soft] : soft [Abdomen Tenderness] : non-tender [Nail Clubbing] : no clubbing  or cyanosis of the fingernails [Involuntary Movements] : no involuntary movements were seen [Skin Color & Pigmentation] : normal skin color and pigmentation [] : no rash [No Focal Deficits] : no focal deficits [Affect] : the affect was normal [Mood] : the mood was normal [FreeTextEntry1] : slow unsteady gait

## 2020-12-21 PROBLEM — Z87.440 HISTORY OF URINARY TRACT INFECTION: Status: RESOLVED | Noted: 2019-04-01 | Resolved: 2020-12-21

## 2021-01-08 ENCOUNTER — APPOINTMENT (OUTPATIENT)
Dept: GERIATRICS | Facility: CLINIC | Age: 86
End: 2021-01-08
Payer: MEDICARE

## 2021-01-08 PROCEDURE — 99215 OFFICE O/P EST HI 40 MIN: CPT | Mod: 95

## 2021-01-08 NOTE — PHYSICAL EXAM
[General Appearance - Alert] : alert [General Appearance - In No Acute Distress] : in no acute distress [Sclera] : the sclera and conjunctiva were normal [No Oral Pallor] : no oral pallor [Outer Ear] : the ears and nose were normal in appearance [Oropharynx] : The oropharynx was normal [Neck Appearance] : the appearance of the neck was normal [] : no respiratory distress [Respiration, Rhythm And Depth] : normal respiratory rhythm and effort [Exaggerated Use Of Accessory Muscles For Inspiration] : no accessory muscle use [Edema] : there was no peripheral edema [Abdomen Tenderness] : non-tender [Skin Color & Pigmentation] : normal skin color and pigmentation [FreeTextEntry1] : able to state days of week, not oriented

## 2021-01-08 NOTE — REVIEW OF SYSTEMS
[Fever] : no fever [Cough] : no cough [Abdominal Pain] : no abdominal pain [Vomiting] : no vomiting [Incontinence] : incontinence [Skin Lesions] : no skin lesions [As Noted in HPI] : as noted in HPI

## 2021-01-08 NOTE — HISTORY OF PRESENT ILLNESS
[Home] : at home, [unfilled] , at the time of the visit. [Medical Office: ([facility])___] : at the medical office located in  [Family Member] : family member [FreeTextEntry1] : 89yoF  with pmhx of dementia, htn, ckd, who is seen with her HHA  seen for follow up visit for dementia with worsening BD.\par \par history also provided by dtr and caregiver.\par \par Dementia with Behavioral disturbances:\par mainly with sundowning and sleeping disturbances with paranoia and anxiety about wanting to go home starts in early evening and continues throughout the night.  the behavior has been worse in this week, but Gin notes progressive decline in her overall anxiety/cognition over the last several months.  Gin endorses patient has hallucinations: sees her mother and has conversations,  not overly bothersome.  She is now at the point of not sleeping for the entire night, anxious, asking to go home.  The caregivers are limiting naps in the day to help encourage sleep during night.  Recent increase in trazodone was not effective at improving the behavior.  Caregiver notes a few occasions of pill pocketing.\par She otherwise without associated new complaints.   She had diarrhea 1 week ago but nothing recent. \par last night with normal formed BM, and this morning with soft bm but no diarrhea.\par no sick contacts.\par \par Care Team:\par Dtr Gin comes over daily\par has HHA 24/7 supervision \par \par ADL's/IADL's:\par Requires total assist needed aside from feeding.\par \par Continence:\par both UI and fecal incontinence.\par \par Appetite:\par Good-- eating well, 3 meals a day with snacks\par \par functional Status:\par walks with cane or walker. denies recent falls. \par \par htn: no lightheadedness, notes sometimes with dizziness.\par BP log reviewed with caregiver:\par 140/59 last night\par 99/42-- 10am 2 days ago --unusual per caregiver, maybe incorrect reading\par 147/58\par 136/54\par 189/75\par \par HR good 71\par \par hypothyroidism:  adherent with medication\par \par \par \par  [FreeTextEntry3] : talib Greenfield

## 2021-01-11 ENCOUNTER — LABORATORY RESULT (OUTPATIENT)
Age: 86
End: 2021-01-11

## 2021-02-04 ENCOUNTER — TRANSCRIPTION ENCOUNTER (OUTPATIENT)
Age: 86
End: 2021-02-04

## 2021-02-22 ENCOUNTER — APPOINTMENT (OUTPATIENT)
Dept: GERIATRICS | Facility: CLINIC | Age: 86
End: 2021-02-22
Payer: MEDICARE

## 2021-02-22 DIAGNOSIS — M79.606 PAIN IN LEG, UNSPECIFIED: ICD-10-CM

## 2021-02-22 PROCEDURE — 99214 OFFICE O/P EST MOD 30 MIN: CPT | Mod: 95

## 2021-02-22 NOTE — PHYSICAL EXAM
[General Appearance - Alert] : alert [General Appearance - In No Acute Distress] : in no acute distress [FreeTextEntry1] : reduced cap refill noted on left great toe compared to right with family emember assist

## 2021-02-22 NOTE — ASSESSMENT
[FreeTextEntry1] : leg pain:\par ddx DVT vs pelvic fracture vs peripheral vascular disease.  \par -discussed in severe PAD would need immediate ER evaluation for treatment -  dtrs Gin and Lynda express goal is to keep patient comfortable at home without taking her to ER, discussed risks of permanent limb damage is not treated.  they expressed understanding\par -plan to obtain home doppler and xray with portal xray company -  \par -discussed pain control: tylenol 1000mg tid (no nsaids give CKD).  advised if worsening pain to notify MD may need stronger analgesics.\par -fall precautions, walker with 1 person assist\par f/u tomorrow

## 2021-02-22 NOTE — HISTORY OF PRESENT ILLNESS
[Home] : at home, [unfilled] , at the time of the visit. [Medical Office: ([facility])___] : at the medical office located in  [Formal Caregiver] : formal caregiver [FreeTextEntry1] : 89yoF with pmhx of dementia, htn, ckd, who is seen with her HHA seen for follow up visit for dementia with worsening BD.\par \par history also provided by dtr and caregiver.\par \par few days left thigh/groin, calf pain.  \par aspercreme over the weekend helped a little bit\par poor sleep\par located on upper thigh\par no recent falls or trauma.\par no new skin rash\par they note left leg feels cooler than right leg\par walking with walker, but needing more assistance and with worse pain\par \par \par Care Team:\par Dtr Gin comes over daily\par has A 24/7 supervision \par \par ADL's/IADL's:\par Requires total assist needed aside from feeding.\par \par Continence:\par both UI and fecal incontinence.\par \par \par functional Status:\par walks with cane or walker. denies recent falls. \par \par htn: no lightheadedness, notes sometimes with dizziness.\par \par \par  [FreeTextEntry3] : talib proctor

## 2021-02-25 ENCOUNTER — RX RENEWAL (OUTPATIENT)
Age: 86
End: 2021-02-25

## 2021-03-16 ENCOUNTER — NON-APPOINTMENT (OUTPATIENT)
Age: 86
End: 2021-03-16

## 2021-03-19 ENCOUNTER — NON-APPOINTMENT (OUTPATIENT)
Age: 86
End: 2021-03-19

## 2021-03-25 DIAGNOSIS — Z20.822 CONTACT WITH AND (SUSPECTED) EXPOSURE TO COVID-19: ICD-10-CM

## 2021-03-29 ENCOUNTER — NON-APPOINTMENT (OUTPATIENT)
Age: 86
End: 2021-03-29

## 2021-03-29 LAB — SARS-COV-2 N GENE NPH QL NAA+PROBE: DETECTED

## 2021-04-01 ENCOUNTER — NON-APPOINTMENT (OUTPATIENT)
Age: 86
End: 2021-04-01

## 2021-04-16 ENCOUNTER — RX RENEWAL (OUTPATIENT)
Age: 86
End: 2021-04-16

## 2021-04-21 ENCOUNTER — NON-APPOINTMENT (OUTPATIENT)
Age: 86
End: 2021-04-21

## 2021-05-07 ENCOUNTER — APPOINTMENT (OUTPATIENT)
Dept: GERIATRICS | Facility: CLINIC | Age: 86
End: 2021-05-07
Payer: MEDICARE

## 2021-05-07 ENCOUNTER — LABORATORY RESULT (OUTPATIENT)
Age: 86
End: 2021-05-07

## 2021-05-07 VITALS
WEIGHT: 111.25 LBS | DIASTOLIC BLOOD PRESSURE: 70 MMHG | SYSTOLIC BLOOD PRESSURE: 130 MMHG | TEMPERATURE: 96.3 F | OXYGEN SATURATION: 97 % | HEART RATE: 65 BPM | RESPIRATION RATE: 15 BRPM | BODY MASS INDEX: 21 KG/M2 | HEIGHT: 61 IN

## 2021-05-07 DIAGNOSIS — R60.0 LOCALIZED EDEMA: ICD-10-CM

## 2021-05-07 DIAGNOSIS — M85.80 OTHER SPECIFIED DISORDERS OF BONE DENSITY AND STRUCTURE, UNSPECIFIED SITE: ICD-10-CM

## 2021-05-07 PROCEDURE — 99072 ADDL SUPL MATRL&STAF TM PHE: CPT

## 2021-05-07 PROCEDURE — 99214 OFFICE O/P EST MOD 30 MIN: CPT

## 2021-05-07 RX ORDER — QUETIAPINE FUMARATE 25 MG/1
25 TABLET ORAL
Qty: 60 | Refills: 3 | Status: DISCONTINUED | COMMUNITY
Start: 2021-01-08 | End: 2021-05-07

## 2021-05-07 RX ORDER — SODIUM BICARBONATE 650 MG/1
650 TABLET ORAL
Refills: 0 | Status: DISCONTINUED | COMMUNITY
Start: 2019-12-06 | End: 2021-05-07

## 2021-05-07 NOTE — PHYSICAL EXAM
[General Appearance - Alert] : alert [General Appearance - In No Acute Distress] : in no acute distress [Sclera] : the sclera and conjunctiva were normal [Extraocular Movements] : extraocular movements were intact [No Oral Pallor] : no oral pallor [Neck Appearance] : the appearance of the neck was normal [Respiration, Rhythm And Depth] : normal respiratory rhythm and effort [Exaggerated Use Of Accessory Muscles For Inspiration] : no accessory muscle use [Heart Sounds] : normal S1 and S2 [Bowel Sounds] : normal bowel sounds [Abdomen Soft] : soft [Abdomen Tenderness] : non-tender [Nail Clubbing] : no clubbing  or cyanosis of the fingernails [Involuntary Movements] : no involuntary movements were seen [Skin Color & Pigmentation] : normal skin color and pigmentation [] : no rash [No Focal Deficits] : no focal deficits [Affect] : the affect was normal [Mood] : the mood was normal [Both Tympanic Membranes Were Examined] : both tympanic membranes were normal [External Female Genitalia] : normal external genitalia [Cervical Lymph Nodes Enlarged Posterior Bilaterally] : posterior cervical [Cervical Lymph Nodes Enlarged Anterior Bilaterally] : anterior cervical [Supraclavicular Lymph Nodes Enlarged Bilaterally] : supraclavicular [FreeTextEntry1] : slow unsteady gait

## 2021-05-07 NOTE — HISTORY OF PRESENT ILLNESS
[FreeTextEntry1] : 89yoF with pmhx of dementia, htn, ckd, hypothyroidism, anemia of ckd, seen for follow up on dementia.\par \par history also provided by dtr and caregiver.\par \par Dementia with Behavioral disturbances:\par mainly with sundowning and sleeping disturbances with paranoia and anxiety.they stopped seroquel for increased daytime sleepiness.\par and restarted trazodone.  only sleepinga bout 3-4 hrs nightly.\par \par also with some neuropathy of lower legs: taking gabapentin 100mg qhs.  not completely controlled.\par \par also with increased lower ext edema:  without associated sob or coughing.\par \par \par Care Team:\par Dtr Gin comes over daily\par has [facility] 24/7 supervision \par \par ADL's/IADL's:\par Requires total assist needed aside from feeding.\par \par Continence:\par both UI and fecal incontinence.\par \par pelvic prolapse:  denies pain, bleeding or vaginal discharge\par \par Appetite:\par Good-- eating well, 3 meals a day with snacks\par \par functional Status:\par walks with walker. denies recent falls. but worse strength \par \par htn: no lightheadedness, notes sometimes with dizziness.\par BP log at home stable.\par \par hypothyroidism: adherent with medication\par

## 2021-05-07 NOTE — REVIEW OF SYSTEMS
[Incontinence] : incontinence [As Noted in HPI] : as noted in HPI [Fever] : no fever [Cough] : no cough [Abdominal Pain] : no abdominal pain [Vomiting] : no vomiting [Skin Lesions] : no skin lesions

## 2021-05-12 LAB
25(OH)D3 SERPL-MCNC: 35.4 NG/ML
ALBUMIN SERPL ELPH-MCNC: 3.9 G/DL
ALP BLD-CCNC: 96 U/L
ALT SERPL-CCNC: 15 U/L
ANION GAP SERPL CALC-SCNC: 15 MMOL/L
AST SERPL-CCNC: 27 U/L
BASOPHILS # BLD AUTO: 0.08 K/UL
BASOPHILS NFR BLD AUTO: 0.5 %
BILIRUB SERPL-MCNC: <0.2 MG/DL
BUN SERPL-MCNC: 50 MG/DL
CALCIUM SERPL-MCNC: 9.3 MG/DL
CHLORIDE SERPL-SCNC: 104 MMOL/L
CO2 SERPL-SCNC: 21 MMOL/L
CREAT SERPL-MCNC: 1.63 MG/DL
EOSINOPHIL # BLD AUTO: 0.14 K/UL
EOSINOPHIL NFR BLD AUTO: 0.9 %
ESTIMATED AVERAGE GLUCOSE: 108 MG/DL
GLUCOSE SERPL-MCNC: 78 MG/DL
HBA1C MFR BLD HPLC: 5.4 %
HCT VFR BLD CALC: 32.7 %
HGB BLD-MCNC: 10 G/DL
IMM GRANULOCYTES NFR BLD AUTO: 0.7 %
LYMPHOCYTES # BLD AUTO: 1.74 K/UL
LYMPHOCYTES NFR BLD AUTO: 11.7 %
MAN DIFF?: NORMAL
MCHC RBC-ENTMCNC: 30.6 GM/DL
MCHC RBC-ENTMCNC: 30.6 PG
MCV RBC AUTO: 100 FL
MONOCYTES # BLD AUTO: 0.93 K/UL
MONOCYTES NFR BLD AUTO: 6.3 %
NEUTROPHILS # BLD AUTO: 11.85 K/UL
NEUTROPHILS NFR BLD AUTO: 79.9 %
PLATELET # BLD AUTO: 295 K/UL
POTASSIUM SERPL-SCNC: 5.3 MMOL/L
PROT SERPL-MCNC: 7.4 G/DL
RBC # BLD: 3.27 M/UL
RBC # FLD: 13.1 %
SODIUM SERPL-SCNC: 140 MMOL/L
TSH SERPL-ACNC: 6.73 UIU/ML
WBC # FLD AUTO: 14.85 K/UL

## 2021-06-02 ENCOUNTER — APPOINTMENT (OUTPATIENT)
Dept: UROGYNECOLOGY | Facility: CLINIC | Age: 86
End: 2021-06-02

## 2021-07-21 ENCOUNTER — APPOINTMENT (OUTPATIENT)
Dept: GERIATRICS | Facility: CLINIC | Age: 86
End: 2021-07-21
Payer: MEDICARE

## 2021-07-21 VITALS
DIASTOLIC BLOOD PRESSURE: 60 MMHG | RESPIRATION RATE: 14 BRPM | WEIGHT: 122.19 LBS | HEART RATE: 56 BPM | SYSTOLIC BLOOD PRESSURE: 100 MMHG | BODY MASS INDEX: 23.07 KG/M2 | OXYGEN SATURATION: 99 % | HEIGHT: 61 IN

## 2021-07-21 DIAGNOSIS — B35.3 TINEA PEDIS: ICD-10-CM

## 2021-07-21 PROCEDURE — 99214 OFFICE O/P EST MOD 30 MIN: CPT

## 2021-07-21 RX ORDER — METOPROLOL SUCCINATE 25 MG/1
25 TABLET, EXTENDED RELEASE ORAL DAILY
Qty: 90 | Refills: 2 | Status: DISCONTINUED | COMMUNITY
Start: 2020-08-07 | End: 2021-07-21

## 2021-07-23 NOTE — REVIEW OF SYSTEMS
[Loss Of Hearing] : hearing loss [Incontinence] : incontinence [As Noted in HPI] : as noted in HPI [Fever] : no fever [Cough] : no cough [Abdominal Pain] : no abdominal pain [Vomiting] : no vomiting [Skin Lesions] : no skin lesions

## 2021-07-23 NOTE — PHYSICAL EXAM
[General Appearance - Alert] : alert [General Appearance - In No Acute Distress] : in no acute distress [Sclera] : the sclera and conjunctiva were normal [Extraocular Movements] : extraocular movements were intact [No Oral Pallor] : no oral pallor [Both Tympanic Membranes Were Examined] : both tympanic membranes were normal [Neck Appearance] : the appearance of the neck was normal [Respiration, Rhythm And Depth] : normal respiratory rhythm and effort [Exaggerated Use Of Accessory Muscles For Inspiration] : no accessory muscle use [Heart Sounds] : normal S1 and S2 [Bowel Sounds] : normal bowel sounds [Abdomen Soft] : soft [Abdomen Tenderness] : non-tender [External Female Genitalia] : normal external genitalia [Cervical Lymph Nodes Enlarged Posterior Bilaterally] : posterior cervical [Cervical Lymph Nodes Enlarged Anterior Bilaterally] : anterior cervical [Supraclavicular Lymph Nodes Enlarged Bilaterally] : supraclavicular [Nail Clubbing] : no clubbing  or cyanosis of the fingernails [Involuntary Movements] : no involuntary movements were seen [Skin Color & Pigmentation] : normal skin color and pigmentation [] : no rash [No Focal Deficits] : no focal deficits [Affect] : the affect was normal [Mood] : the mood was normal [Musculoskeletal - Swelling] : no joint swelling seen [FreeTextEntry1] : fungal infection of left 1st toe

## 2021-07-23 NOTE — HISTORY OF PRESENT ILLNESS
[FreeTextEntry1] : 89yoF with pmhx of dementia, htn, ckd, hypothyroidism, anemia of ckd, seen for follow up on dementia.\par \par history also provided by dtr and caregiver.\par \par Dementia with Behavioral disturbances:\par mainly with sundowning and sleeping disturbances with paranoia and anxiety.they stopped seroquel for increased daytime sleepiness.\par and restarted trazodone. only sleeping a bout 3-4 hrs nightly.\par hallucinations of people occur, stating she wants to go home\par \par also with some neuropathy of lower legs: taking gabapentin 100mg qhs. not completely controlled.\par some episodic pain noted in left leg when working with PT\par \par no increased edema\par \par Care Team:\par Dtr Gin comes over daily\par has [facility] 24/7 supervision \par \par ADL's/IADL's:\par Requires total assist needed aside from feeding.\par \par Continence:\par both UI and fecal incontinence.\par no skin breakdown\par \par pelvic prolapse: denies pain, bleeding or vaginal discharge\par \par Appetite:\par Good-- eating well, 3 meals a day with snacks\par \par functional Status:\par walks with walker. denies recent falls. but worse strength \par \par htn: no lightheadedness, notes sometimes with dizziness.\par \par \par hypothyroidism: adherent with medication\par \par

## 2021-07-28 RX ORDER — TERBINAFINE HYDROCHLORIDE 1 G/100G
1 CREAM TOPICAL
Qty: 2 | Refills: 1 | Status: DISCONTINUED | COMMUNITY
Start: 2021-07-21 | End: 2021-07-28

## 2021-07-28 RX ORDER — KETOCONAZOLE 20 MG/G
2 CREAM TOPICAL
Qty: 1 | Refills: 1 | Status: ACTIVE | COMMUNITY
Start: 2021-07-28 | End: 1900-01-01

## 2021-08-27 ENCOUNTER — INPATIENT (INPATIENT)
Facility: HOSPITAL | Age: 86
LOS: 2 days | Discharge: HOME HEALTH | DRG: 301 | End: 2021-08-30
Attending: INTERNAL MEDICINE | Admitting: HOSPITALIST
Payer: COMMERCIAL

## 2021-08-27 VITALS
HEIGHT: 63 IN | SYSTOLIC BLOOD PRESSURE: 147 MMHG | OXYGEN SATURATION: 98 % | RESPIRATION RATE: 18 BRPM | TEMPERATURE: 98 F | DIASTOLIC BLOOD PRESSURE: 55 MMHG | HEART RATE: 108 BPM | WEIGHT: 121.92 LBS

## 2021-08-27 DIAGNOSIS — I10 ESSENTIAL (PRIMARY) HYPERTENSION: ICD-10-CM

## 2021-08-27 DIAGNOSIS — I82.409 ACUTE EMBOLISM AND THROMBOSIS OF UNSPECIFIED DEEP VEINS OF UNSPECIFIED LOWER EXTREMITY: ICD-10-CM

## 2021-08-27 DIAGNOSIS — E03.9 HYPOTHYROIDISM, UNSPECIFIED: ICD-10-CM

## 2021-08-27 DIAGNOSIS — S91.309A UNSPECIFIED OPEN WOUND, UNSPECIFIED FOOT, INITIAL ENCOUNTER: ICD-10-CM

## 2021-08-27 DIAGNOSIS — F03.90 UNSPECIFIED DEMENTIA, UNSPECIFIED SEVERITY, WITHOUT BEHAVIORAL DISTURBANCE, PSYCHOTIC DISTURBANCE, MOOD DISTURBANCE, AND ANXIETY: ICD-10-CM

## 2021-08-27 LAB
ALBUMIN SERPL ELPH-MCNC: 3.8 G/DL — SIGNIFICANT CHANGE UP (ref 3.3–5)
ALP SERPL-CCNC: 103 U/L — SIGNIFICANT CHANGE UP (ref 40–120)
ALT FLD-CCNC: 10 U/L — SIGNIFICANT CHANGE UP (ref 10–45)
ANION GAP SERPL CALC-SCNC: 16 MMOL/L — SIGNIFICANT CHANGE UP (ref 5–17)
APTT BLD: 28.7 SEC — SIGNIFICANT CHANGE UP (ref 27.5–35.5)
APTT BLD: 80.8 SEC — HIGH (ref 27.5–35.5)
AST SERPL-CCNC: 21 U/L — SIGNIFICANT CHANGE UP (ref 10–40)
BASE EXCESS BLDV CALC-SCNC: 0.1 MMOL/L — SIGNIFICANT CHANGE UP (ref -2–2)
BILIRUB SERPL-MCNC: 0.2 MG/DL — SIGNIFICANT CHANGE UP (ref 0.2–1.2)
BUN SERPL-MCNC: 40 MG/DL — HIGH (ref 7–23)
CA-I SERPL-SCNC: 1.28 MMOL/L — SIGNIFICANT CHANGE UP (ref 1.15–1.33)
CALCIUM SERPL-MCNC: 9.7 MG/DL — SIGNIFICANT CHANGE UP (ref 8.4–10.5)
CHLORIDE BLDV-SCNC: 108 MMOL/L — SIGNIFICANT CHANGE UP (ref 96–108)
CHLORIDE SERPL-SCNC: 105 MMOL/L — SIGNIFICANT CHANGE UP (ref 96–108)
CO2 BLDV-SCNC: 27 MMOL/L — HIGH (ref 22–26)
CO2 SERPL-SCNC: 21 MMOL/L — LOW (ref 22–31)
CREAT SERPL-MCNC: 1.58 MG/DL — HIGH (ref 0.5–1.3)
GAS PNL BLDV: 142 MMOL/L — SIGNIFICANT CHANGE UP (ref 136–145)
GAS PNL BLDV: SIGNIFICANT CHANGE UP
GAS PNL BLDV: SIGNIFICANT CHANGE UP
GLUCOSE BLDV-MCNC: 178 MG/DL — HIGH (ref 70–99)
GLUCOSE SERPL-MCNC: 171 MG/DL — HIGH (ref 70–99)
HCO3 BLDV-SCNC: 26 MMOL/L — SIGNIFICANT CHANGE UP (ref 22–29)
HCT VFR BLD CALC: 31.4 % — LOW (ref 34.5–45)
HCT VFR BLD CALC: 31.4 % — LOW (ref 34.5–45)
HCT VFR BLDA CALC: 24 % — LOW (ref 34.5–46.5)
HGB BLD CALC-MCNC: 8.1 G/DL — LOW (ref 11.7–16.1)
HGB BLD-MCNC: 10 G/DL — LOW (ref 11.5–15.5)
HGB BLD-MCNC: 9.8 G/DL — LOW (ref 11.5–15.5)
LACTATE BLDV-MCNC: 2.6 MMOL/L — HIGH (ref 0.7–2)
MAGNESIUM SERPL-MCNC: 2.2 MG/DL — SIGNIFICANT CHANGE UP (ref 1.6–2.6)
MCHC RBC-ENTMCNC: 29.4 PG — SIGNIFICANT CHANGE UP (ref 27–34)
MCHC RBC-ENTMCNC: 29.6 PG — SIGNIFICANT CHANGE UP (ref 27–34)
MCHC RBC-ENTMCNC: 31.2 GM/DL — LOW (ref 32–36)
MCHC RBC-ENTMCNC: 31.8 GM/DL — LOW (ref 32–36)
MCV RBC AUTO: 92.9 FL — SIGNIFICANT CHANGE UP (ref 80–100)
MCV RBC AUTO: 94.3 FL — SIGNIFICANT CHANGE UP (ref 80–100)
NRBC # BLD: 0 /100 WBCS — SIGNIFICANT CHANGE UP (ref 0–0)
NRBC # BLD: 0 /100 WBCS — SIGNIFICANT CHANGE UP (ref 0–0)
PCO2 BLDV: 47 MMHG — HIGH (ref 39–42)
PH BLDV: 7.35 — SIGNIFICANT CHANGE UP (ref 7.32–7.43)
PHOSPHATE SERPL-MCNC: 3.4 MG/DL — SIGNIFICANT CHANGE UP (ref 2.5–4.5)
PLATELET # BLD AUTO: 313 K/UL — SIGNIFICANT CHANGE UP (ref 150–400)
PLATELET # BLD AUTO: 336 K/UL — SIGNIFICANT CHANGE UP (ref 150–400)
PO2 BLDV: 31 MMHG — SIGNIFICANT CHANGE UP (ref 25–45)
POTASSIUM BLDV-SCNC: 5 MMOL/L — SIGNIFICANT CHANGE UP (ref 3.5–5.1)
POTASSIUM SERPL-MCNC: 4.7 MMOL/L — SIGNIFICANT CHANGE UP (ref 3.5–5.3)
POTASSIUM SERPL-SCNC: 4.7 MMOL/L — SIGNIFICANT CHANGE UP (ref 3.5–5.3)
PROT SERPL-MCNC: 7.5 G/DL — SIGNIFICANT CHANGE UP (ref 6–8.3)
RBC # BLD: 3.33 M/UL — LOW (ref 3.8–5.2)
RBC # BLD: 3.38 M/UL — LOW (ref 3.8–5.2)
RBC # FLD: 12.6 % — SIGNIFICANT CHANGE UP (ref 10.3–14.5)
RBC # FLD: 12.8 % — SIGNIFICANT CHANGE UP (ref 10.3–14.5)
SAO2 % BLDV: 56.2 % — LOW (ref 67–88)
SODIUM SERPL-SCNC: 142 MMOL/L — SIGNIFICANT CHANGE UP (ref 135–145)
WBC # BLD: 11.33 K/UL — HIGH (ref 3.8–10.5)
WBC # BLD: 14.17 K/UL — HIGH (ref 3.8–10.5)
WBC # FLD AUTO: 11.33 K/UL — HIGH (ref 3.8–10.5)
WBC # FLD AUTO: 14.17 K/UL — HIGH (ref 3.8–10.5)

## 2021-08-27 PROCEDURE — 93970 EXTREMITY STUDY: CPT | Mod: 26

## 2021-08-27 PROCEDURE — 99223 1ST HOSP IP/OBS HIGH 75: CPT

## 2021-08-27 PROCEDURE — 73620 X-RAY EXAM OF FOOT: CPT | Mod: 26,LT

## 2021-08-27 PROCEDURE — 99285 EMERGENCY DEPT VISIT HI MDM: CPT

## 2021-08-27 RX ORDER — HEPARIN SODIUM 5000 [USP'U]/ML
INJECTION INTRAVENOUS; SUBCUTANEOUS
Qty: 25000 | Refills: 0 | Status: DISCONTINUED | OUTPATIENT
Start: 2021-08-27 | End: 2021-08-29

## 2021-08-27 RX ORDER — HEPARIN SODIUM 5000 [USP'U]/ML
4000 INJECTION INTRAVENOUS; SUBCUTANEOUS EVERY 6 HOURS
Refills: 0 | Status: DISCONTINUED | OUTPATIENT
Start: 2021-08-27 | End: 2021-08-29

## 2021-08-27 RX ORDER — TRAZODONE HCL 50 MG
50 TABLET ORAL AT BEDTIME
Refills: 0 | Status: DISCONTINUED | OUTPATIENT
Start: 2021-08-27 | End: 2021-08-30

## 2021-08-27 RX ORDER — HEPARIN SODIUM 5000 [USP'U]/ML
INJECTION INTRAVENOUS; SUBCUTANEOUS
Qty: 25000 | Refills: 0 | Status: DISCONTINUED | OUTPATIENT
Start: 2021-08-27 | End: 2021-08-27

## 2021-08-27 RX ORDER — GABAPENTIN 400 MG/1
100 CAPSULE ORAL AT BEDTIME
Refills: 0 | Status: DISCONTINUED | OUTPATIENT
Start: 2021-08-27 | End: 2021-08-27

## 2021-08-27 RX ORDER — NIFEDIPINE 30 MG
60 TABLET, EXTENDED RELEASE 24 HR ORAL DAILY
Refills: 0 | Status: DISCONTINUED | OUTPATIENT
Start: 2021-08-27 | End: 2021-08-30

## 2021-08-27 RX ORDER — LEVOTHYROXINE SODIUM 125 MCG
112 TABLET ORAL DAILY
Refills: 0 | Status: DISCONTINUED | OUTPATIENT
Start: 2021-08-27 | End: 2021-08-30

## 2021-08-27 RX ORDER — HEPARIN SODIUM 5000 [USP'U]/ML
2000 INJECTION INTRAVENOUS; SUBCUTANEOUS EVERY 6 HOURS
Refills: 0 | Status: DISCONTINUED | OUTPATIENT
Start: 2021-08-27 | End: 2021-08-27

## 2021-08-27 RX ORDER — SODIUM CHLORIDE 9 MG/ML
500 INJECTION, SOLUTION INTRAVENOUS ONCE
Refills: 0 | Status: COMPLETED | OUTPATIENT
Start: 2021-08-27 | End: 2021-08-27

## 2021-08-27 RX ORDER — HEPARIN SODIUM 5000 [USP'U]/ML
2000 INJECTION INTRAVENOUS; SUBCUTANEOUS EVERY 6 HOURS
Refills: 0 | Status: DISCONTINUED | OUTPATIENT
Start: 2021-08-27 | End: 2021-08-29

## 2021-08-27 RX ORDER — MEMANTINE HYDROCHLORIDE 10 MG/1
1 TABLET ORAL
Qty: 0 | Refills: 0 | DISCHARGE

## 2021-08-27 RX ORDER — HEPARIN SODIUM 5000 [USP'U]/ML
4000 INJECTION INTRAVENOUS; SUBCUTANEOUS EVERY 6 HOURS
Refills: 0 | Status: DISCONTINUED | OUTPATIENT
Start: 2021-08-27 | End: 2021-08-27

## 2021-08-27 RX ORDER — GABAPENTIN 400 MG/1
200 CAPSULE ORAL AT BEDTIME
Refills: 0 | Status: DISCONTINUED | OUTPATIENT
Start: 2021-08-27 | End: 2021-08-30

## 2021-08-27 RX ORDER — LOSARTAN POTASSIUM 100 MG/1
50 TABLET, FILM COATED ORAL DAILY
Refills: 0 | Status: DISCONTINUED | OUTPATIENT
Start: 2021-08-27 | End: 2021-08-30

## 2021-08-27 RX ORDER — MEMANTINE HYDROCHLORIDE 10 MG/1
5 TABLET ORAL
Refills: 0 | Status: DISCONTINUED | OUTPATIENT
Start: 2021-08-27 | End: 2021-08-30

## 2021-08-27 RX ORDER — GABAPENTIN 400 MG/1
1 CAPSULE ORAL
Qty: 0 | Refills: 0 | DISCHARGE

## 2021-08-27 RX ORDER — MEMANTINE HYDROCHLORIDE 10 MG/1
5 TABLET ORAL DAILY
Refills: 0 | Status: DISCONTINUED | OUTPATIENT
Start: 2021-08-27 | End: 2021-08-27

## 2021-08-27 RX ADMIN — Medication 50 MILLIGRAM(S): at 21:47

## 2021-08-27 RX ADMIN — Medication 300 MILLIGRAM(S): at 21:48

## 2021-08-27 RX ADMIN — HEPARIN SODIUM 1000 UNIT(S)/HR: 5000 INJECTION INTRAVENOUS; SUBCUTANEOUS at 17:05

## 2021-08-27 RX ADMIN — SODIUM CHLORIDE 1000 MILLILITER(S): 9 INJECTION, SOLUTION INTRAVENOUS at 16:52

## 2021-08-27 RX ADMIN — GABAPENTIN 200 MILLIGRAM(S): 400 CAPSULE ORAL at 21:47

## 2021-08-27 NOTE — CONSULT NOTE ADULT - ASSESSMENT
89yo F w/ left foot hallux wound to subq  - pt seen and evaluated  - afebrile, WBC 11.33,   - left foot hallux wound to subq w/ toenail absent, no probing to bone, no purulence, no fluctuance, no malodor, erythema 2/2 dependent rubor  - left foot wound culture taken   - left foot xray preliminary read showing no gas, no OM   - no acute pod surgical intervention necessary  - recommend d/c on PO Clinda for 1 week   - stable for discharge from podiatry standpoint to f/u outpatient w/ Dr. Zaragoza/Dr. Augustin within 1 week, call [phone] for appointment  - discussed w/ attending

## 2021-08-27 NOTE — ED ADULT NURSE NOTE - OBJECTIVE STATEMENT
89 yr old female to ED accomp by daughter with hx dementia. Pt awake. Has  HTN, DM (not on meds), dementia . Pt sent to ED from podiatry clinic for   toe wound . History obtained from daughter at bedside.  AOx1 baseline. Pt was first noted to have foot wound earlier this week at Nephrology clinic, sent to podiatry, who today noted a L 1st toe wound with drainage. Podiatry at bedside. Denies fever or chills per daughter.

## 2021-08-27 NOTE — H&P ADULT - ASSESSMENT
89 yo F PMH HTN, DM (not on meds), dementia sent from podiatry clinic for toe wound noted today. History obtained from daughter at bedside given h/o dementia, AOx1 baseline.     Pt was first noted to have foot wound earlier this week at Nephrology clinic, sent to podiatry, who today noted a L 1st toe wound with drainage. Sent to ED for infectious and possible vascular eval.     Per daughter, pt lives at home w 24 hr aides and can ambulate w a walker. Pt has not had any recent fever, altered mental status and has not complained of pain. No reported diarrhea, vomiting or dyspnea.     LE doppler in ED revealed acute deep venous thrombosis: above the knee involving the left external iliac, common femoral, and femoral veins.    Pt currently in ED room, awake, pleasant. Denies pain.

## 2021-08-27 NOTE — H&P ADULT - HISTORY OF PRESENT ILLNESS
CHIEF COMPLAINT: Patient is a 89y old  Female who presents with a chief complaint of foot wound.     HPI: 89 yo F PMH HTN, DM (not on meds), dementia sent from podiatry clinic for toe wound noted today. History obtained from daughter at bedside given h/o dementia, AOx1 baseline.     Pt was first noted to have foot wound earlier this week at Nephrology clinic, sent to podiatry, who today noted a L 1st toe wound with drainage. Sent to ED for infectious and possible vascular eval.     Per daughter, pt lives at home w 24 hr aides and can ambulate w a walker. Pt has not had any recent fever, altered mental status and has not complained of pain. No reported diarrhea, vomiting or dyspnea.     Pt currently in ED room, awake, pleasant. Denies pain.     Allergies    penicillin (Unknown)  sulfa drugs (Unknown)    Home Medications:      MEDICATIONS  (STANDING):  heparin  Infusion.  Unit(s)/Hr (10 mL/Hr) IV Continuous <Continuous>    MEDICATIONS  (PRN):  heparin   Injectable 4000 Unit(s) IV Push every 6 hours PRN For aPTT less than 40  heparin   Injectable 2000 Unit(s) IV Push every 6 hours PRN For aPTT between 40 - 57      PAST MEDICAL & SURGICAL HISTORY:  Diabetes mellitus    HTN (hypertension)    Dementia    Functional Assessment: [ ] Independent  [ x] Assistance  [ ] Total care  [ ] Non-ambulatory    SOCIAL HISTORY:  Residence: [ ] [facility]  [ ] SNF  [x ] Community  [x ] Substance abuse: None      FAMILY HISTORY:  [ x] No pertinent family history in first degree relatives     REVIEW OF SYSTEMS:    CONSTITUTIONAL: No fever, weight loss, or fatigue  EYES: No eye pain, visual disturbances, or discharge  ENMT:  No difficulty hearing, tinnitus, vertigo; No sinus or throat pain  NECK: No pain or stiffness  BREASTS: No pain, masses, or nipple discharge  RESPIRATORY: No cough, wheezing, chills or hemoptysis; No shortness of breath  CARDIOVASCULAR: No chest pain, palpitations, dizziness, or leg swelling  GASTROINTESTINAL: No abdominal or epigastric pain. No nausea, vomiting, or hematemesis; No diarrhea or constipation. No melena or hematochezia.  GENITOURINARY: No dysuria, frequency, hematuria, or incontinence  NEUROLOGICAL: No headaches, loss of strength, numbness, or tremors  SKIN: No itching, burning  LYMPH NODES: No enlarged glands  ENDOCRINE: No heat or cold intolerance; No hair loss  MUSCULOSKELETAL: No muscle or back pain  PSYCHIATRIC: No depression, anxiety, mood swings, or difficulty sleeping  HEME/LYMPH: No easy bruising, or bleeding gums  ALLERGY AND IMMUNOLOGIC: No hives or eczema    [x  ] All other ROS negative    PHYSICAL EXAM:    Vital Signs Last 24 Hrs  T(C): 36.7 (27 Aug 2021 11:35), Max: 36.7 (27 Aug 2021 11:35)  T(F): 98 (27 Aug 2021 11:35), Max: 98 (27 Aug 2021 11:35)  HR: 108 (27 Aug 2021 11:35) (108 - 108)  BP: 147/55 (27 Aug 2021 11:35) (147/55 - 147/55)  BP(mean): --  RR: 18 (27 Aug 2021 11:35) (18 - 18)  SpO2: 98% (27 Aug 2021 11:35) (98% - 98%)    GENERAL: NAD, well-groomed, well-developed  HEAD:  Atraumatic, Normocephalic  EYES: EOMI, PERRLA, conjunctiva and sclera clear  ENMT: Moist mucous membranes  NECK: Supple, No JVD  RESPIRATORY: Clear to auscultation bilaterally; No rales, rhonchi, wheezing, or rubs  CARDIOVASCULAR: Regular rate and rhythm; No murmurs, rubs, or gallops  GASTROINTESTINAL: Soft, Nontender, Nondistended; Bowel sounds present  GENITOURINARY: Not examined  EXTREMITIES:  2+ Peripheral Pulses, No clubbing, cyanosis, or edema  NERVOUS SYSTEM:  Alert & Oriented X1; Moving all 4 extremities; No gross sensory deficits  HEME/LYMPH: No lymphadenopathy noted  SKIN: L foot bandaged. Erythema at ankle and lower leg    LABS:                        9.8    11.33 )-----------( 336      ( 27 Aug 2021 12:55 )             31.4     Hemoglobin: 9.8 g/dL (08-27 @ 12:55)    08-27    142  |  105  |  40<H>  ----------------------------<  171<H>  4.7   |  21<L>  |  1.58<H>    Ca    9.7      27 Aug 2021 12:55  Phos  3.4     08-27  Mg     2.2     08-27    TPro  7.5  /  Alb  3.8  /  TBili  0.2  /  DBili  x   /  AST  21  /  ALT  10  /  AlkPhos  103  08-27    PTT - ( 27 Aug 2021 15:13 )  PTT:28.7 sec    CAPILLARY BLOOD GLUCOSE      POCT Blood Glucose.: 169 mg/dL (27 Aug 2021 12:35)        RADIOLOGY & ADDITIONAL STUDIES:                  [x ] Consultant(s) Notes Reviewed- Podiatry    [x ] Fall risks identified:    [x ] Probable osteoporosis    [x ] Increased delirium risk    [ x] Delirium and other risks can be reduced by:          -early ambulation          -minimizing "tethers" - IV, oxygen, catheters, etc          -avoiding hypnotics and sedatives          -maintaining hydration/nutrition          -avoid anticholinergics - diphenhydramine, etc          -pain control          -supportive environment    Advanced Directives: [ ] DNR  [ ] No feeding tube  [ ] MOLST in chart  [ ] MOLST completed today  [x ] Unknown   CHIEF COMPLAINT: Patient is a 89y old  Female who presents with a chief complaint of foot wound.     HPI: 89 yo F PMH HTN, DM (not on meds), dementia sent from podiatry clinic for toe wound noted today. History obtained from daughter at bedside given h/o dementia, AOx1 baseline.     Pt was first noted to have foot wound earlier this week at Nephrology clinic, sent to podiatry, who today noted a L 1st toe wound with drainage. Sent to ED for infectious and possible vascular eval.     Per daughter, pt lives at home w 24 hr aides and can ambulate w a walker. Pt has not had any recent fever, altered mental status and has not complained of pain. No reported diarrhea, vomiting or dyspnea.     Pt currently in ED room, awake, pleasant. Denies pain.     Allergies    penicillin (Unknown)  sulfa drugs (Unknown)    Home Medications:  gabapentin 100 mg oral tablet: 1 tab(s) orally once (at bedtime) (27 Aug 2021 18:35)  irbesartan 150 mg oral tablet: 1 tab(s) orally once a day (27 Aug 2021 18:35)  levothyroxine 112 mcg (0.112 mg) oral tablet: 1 tab(s) orally once a day (27 Aug 2021 18:35)  memantine 5 mg oral tablet: 1 tab(s) orally once a day (27 Aug 2021 18:35)  Multiple Vitamins oral tablet: 1 tab(s) orally once a day (27 Aug 2021 18:35)  NIFEdipine 60 mg oral tablet, extended release: 1 tab(s) orally once a day (27 Aug 2021 18:35)        MEDICATIONS  (STANDING):  heparin  Infusion.  Unit(s)/Hr (10 mL/Hr) IV Continuous <Continuous>    MEDICATIONS  (PRN):  heparin   Injectable 4000 Unit(s) IV Push every 6 hours PRN For aPTT less than 40  heparin   Injectable 2000 Unit(s) IV Push every 6 hours PRN For aPTT between 40 - 57      PAST MEDICAL & SURGICAL HISTORY:  Diabetes mellitus    HTN (hypertension)    Dementia    Functional Assessment: [ ] Independent  [ x] Assistance  [ ] Total care  [ ] Non-ambulatory    SOCIAL HISTORY:  Residence: [ ] [facility]  [ ] SNF  [x ] Community  [x ] Substance abuse: None      FAMILY HISTORY:  [ x] No pertinent family history in first degree relatives     REVIEW OF SYSTEMS:    CONSTITUTIONAL: No fever, weight loss, or fatigue  EYES: No eye pain, visual disturbances, or discharge  ENMT:  No difficulty hearing, tinnitus, vertigo; No sinus or throat pain  NECK: No pain or stiffness  BREASTS: No pain, masses, or nipple discharge  RESPIRATORY: No cough, wheezing, chills or hemoptysis; No shortness of breath  CARDIOVASCULAR: No chest pain, palpitations, dizziness, or leg swelling  GASTROINTESTINAL: No abdominal or epigastric pain. No nausea, vomiting, or hematemesis; No diarrhea or constipation. No melena or hematochezia.  GENITOURINARY: No dysuria, frequency, hematuria, or incontinence  NEUROLOGICAL: No headaches, loss of strength, numbness, or tremors  SKIN: No itching, burning  LYMPH NODES: No enlarged glands  ENDOCRINE: No heat or cold intolerance; No hair loss  MUSCULOSKELETAL: No muscle or back pain  PSYCHIATRIC: No depression, anxiety, mood swings, or difficulty sleeping  HEME/LYMPH: No easy bruising, or bleeding gums  ALLERGY AND IMMUNOLOGIC: No hives or eczema    [x  ] All other ROS negative    PHYSICAL EXAM:    Vital Signs Last 24 Hrs  T(C): 36.7 (27 Aug 2021 11:35), Max: 36.7 (27 Aug 2021 11:35)  T(F): 98 (27 Aug 2021 11:35), Max: 98 (27 Aug 2021 11:35)  HR: 108 (27 Aug 2021 11:35) (108 - 108)  BP: 147/55 (27 Aug 2021 11:35) (147/55 - 147/55)  BP(mean): --  RR: 18 (27 Aug 2021 11:35) (18 - 18)  SpO2: 98% (27 Aug 2021 11:35) (98% - 98%)    GENERAL: NAD, well-groomed, well-developed  HEAD:  Atraumatic, Normocephalic  EYES: EOMI, PERRLA, conjunctiva and sclera clear  ENMT: Moist mucous membranes  NECK: Supple, No JVD  RESPIRATORY: Clear to auscultation bilaterally; No rales, rhonchi, wheezing, or rubs  CARDIOVASCULAR: Regular rate and rhythm; No murmurs, rubs, or gallops  GASTROINTESTINAL: Soft, Nontender, Nondistended; Bowel sounds present  GENITOURINARY: Not examined  EXTREMITIES:  2+ Peripheral Pulses, No clubbing, cyanosis, or edema  NERVOUS SYSTEM:  Alert & Oriented X1; Moving all 4 extremities; No gross sensory deficits  HEME/LYMPH: No lymphadenopathy noted  SKIN: L foot bandaged. Erythema at ankle and lower leg    LABS:                        9.8    11.33 )-----------( 336      ( 27 Aug 2021 12:55 )             31.4     Hemoglobin: 9.8 g/dL (08-27 @ 12:55)    08-27    142  |  105  |  40<H>  ----------------------------<  171<H>  4.7   |  21<L>  |  1.58<H>    Ca    9.7      27 Aug 2021 12:55  Phos  3.4     08-27  Mg     2.2     08-27    TPro  7.5  /  Alb  3.8  /  TBili  0.2  /  DBili  x   /  AST  21  /  ALT  10  /  AlkPhos  103  08-27    PTT - ( 27 Aug 2021 15:13 )  PTT:28.7 sec    CAPILLARY BLOOD GLUCOSE      POCT Blood Glucose.: 169 mg/dL (27 Aug 2021 12:35)        RADIOLOGY & ADDITIONAL STUDIES:                  [x ] Consultant(s) Notes Reviewed- Podiatry    [x ] Fall risks identified:    [x ] Probable osteoporosis    [x ] Increased delirium risk    [ x] Delirium and other risks can be reduced by:          -early ambulation          -minimizing "tethers" - IV, oxygen, catheters, etc          -avoiding hypnotics and sedatives          -maintaining hydration/nutrition          -avoid anticholinergics - diphenhydramine, etc          -pain control          -supportive environment    Advanced Directives: [ ] DNR  [ ] No feeding tube  [ ] MOLST in chart  [ ] MOLST completed today  [x ] Unknown

## 2021-08-27 NOTE — ED PROVIDER NOTE - ATTENDING CONTRIBUTION TO CARE
Patient is a 89 yo F with history of DM (not on medication), HTN, dementia sent in by podiatry clinic for evaluation of toe wound. Daughter is at bedside providing history. Wound was noted earlier this week at her first left toe. There is drainage. No pain, fevers, chills, nausea, vomiting.    VS noted  Gen. no acute distress, Non toxic   HEENT: EOMI, mmm  Lungs: CTAB/L no C/ W /R   CVS: RRR   Abd; Soft non tender, non distended   Ext: left leg is more tense compared to right leg, left 1st toe is without a toenail, mild erythema, no ttp  Skin: no rash  Neuro AAOx1 non focal clear speech  a/p: toe wound, eval for cellulitis. plan for podiatry consult, LLE doppler, labs, XR of toe.  - Venus ORR

## 2021-08-27 NOTE — ED PROVIDER NOTE - PHYSICAL EXAMINATION
GENERAL: NAD, lying in bed comfortably  HEAD:  Atraumatic, Normocephalic  EYES: EOMI, PERRLA, conjunctiva and sclera clear  ENT: Moist mucous membranes  NECK: Supple, No JVD  CHEST/LUNG: Clear to auscultation bilaterally; No rales, rhonchi, wheezing, or rubs. Unlabored respirations  HEART: Regular rate and rhythm; No murmurs, rubs, or gallops  ABDOMEN: Bowel sounds present; Soft, Nontender, Nondistended. No hepatomegaly  EXTREMITIES:  1+ pulses b/l, LLE erythematous and warm compared to R, nontender. L 1st toe wound noted with yellow drainage on dressing  NERVOUS SYSTEM:  Alert & Oriented X3, speech clear. No deficits   MSK: FROM all 4 extremities, full and equal strength  SKIN: No rashes or lesions

## 2021-08-27 NOTE — ED PROVIDER NOTE - OBJECTIVE STATEMENT
89F PMH HTN, DM (not on meds), dementia sent from podiatry clinic for toe wound noted today. History obtained from daughter at bedside given h/o dementia, AOx1 baseline. Pt was first noted to have foot wound earlier this week at Nephrology clinic, sent to podiatry, who today noted a L 1st toe wound with drainage. Sent to ED for infectious and possible vascular eval. Pt denies toe pain, leg pain/swelling. No fevers, chills recently.

## 2021-08-27 NOTE — ED PROVIDER NOTE - PROGRESS NOTE DETAILS
Christelle Mcnair PGY3: Duplex +ve for extensive LLE DVT. Started on hep gtt. Venus ORR: MONISHA doppler shows: Acute deep venous thrombosis: above the knee involving the left external iliac, common femoral, and femoral veins. Patient is a significant fall risk, difficult to care for 2/2 dementia. Plan for heparin, admission to eval for possible IVC filter. Patient was reassessed - no clinical signs of shortness of breath or discomfort.

## 2021-08-27 NOTE — ED PROVIDER NOTE - NS ED ROS FT
REVIEW OF SYSTEMS:  [ ] Unable to assess ROS because ______  CONSTITUTIONAL: No fever, chills, night sweats, or fatigue  EYES: No eye pain, visual disturbances, or discharge  ENMT:  No difficulty hearing, tinnitus, vertigo; No sinus or throat pain  NECK: No pain or stiffness  BREASTS: No pain, masses, or nipple discharge  RESPIRATORY: No cough, wheezing, or hemoptysis; No shortness of breath  CARDIOVASCULAR: No chest pain, palpitations, dizziness, or leg swelling  GASTROINTESTINAL: No abdominal or epigastric pain. No nausea, vomiting, or hematemesis; No diarrhea or constipation. No melena or hematochezia.  GENITOURINARY: No dysuria, frequency, hematuria, or incontinence  NEUROLOGICAL: No headaches, memory loss, loss of strength, numbness, or tremors  SKIN: No itching, burning, rashes, or lesions   LYMPH NODES: No enlarged glands  ENDOCRINE: No heat or cold intolerance; No hair loss  MUSCULOSKELETAL: No joint pain or swelling; No muscle, back, or extremity pain  PSYCHIATRIC: No depression, anxiety, mood swings, or difficulty sleeping  HEME/LYMPH: No easy bruising, or bleeding gums  ALLERGY AND IMMUNOLOGIC: No hives or eczema    All others negative except as per HPI

## 2021-08-27 NOTE — CONSULT NOTE ADULT - SUBJECTIVE AND OBJECTIVE BOX
Podiatry pager #: [phone] ([place])/ [phone] ([facility])    Patient is a 89y old  Female who presents with a chief complaint of left foot wound     HPI:  89F PMH HTN, DM (not on meds), dementia sent from podiatry clinic for toe wound noted today. History obtained from daughter at bedside given h/o dementia, AOx1 baseline. Pt was first noted to have foot wound earlier this week at Nephrology clinic, sent to podiatry, who today noted a L 1st toe wound with drainage. Sent to ED for infectious and possible vascular eval. Pt denies toe pain, leg pain/swelling. No fevers, chills recently.    PAST MEDICAL & SURGICAL HISTORY:  Diabetes mellitus    HTN (hypertension)    Dementia        MEDICATIONS  (STANDING):  lactated ringers Bolus 500 milliLiter(s) IV Bolus once    MEDICATIONS  (PRN):      Allergies    penicillin (Unknown)  sulfa drugs (Unknown)    Intolerances        VITALS:    Vital Signs Last 24 Hrs  T(C): 36.7 (27 Aug 2021 11:35), Max: 36.7 (27 Aug 2021 11:35)  T(F): 98 (27 Aug 2021 11:35), Max: 98 (27 Aug 2021 11:35)  HR: 108 (27 Aug 2021 11:35) (108 - 108)  BP: 147/55 (27 Aug 2021 11:35) (147/55 - 147/55)  BP(mean): --  RR: 18 (27 Aug 2021 11:35) (18 - 18)  SpO2: 98% (27 Aug 2021 11:35) (98% - 98%)    LABS:                          9.8    11.33 )-----------( 336      ( 27 Aug 2021 12:55 )             31.4       08-27    142  |  105  |  40<H>  ----------------------------<  171<H>  4.7   |  21<L>  |  1.58<H>    Ca    9.7      27 Aug 2021 12:55  Phos  3.4     08-27  Mg     2.2     08-27    TPro  7.5  /  Alb  3.8  /  TBili  0.2  /  DBili  x   /  AST  21  /  ALT  10  /  AlkPhos  103  08-27      CAPILLARY BLOOD GLUCOSE      POCT Blood Glucose.: 169 mg/dL (27 Aug 2021 12:35)          LOWER EXTREMITY PHYSICAL EXAM:    Vascular: DP/PT 0/4 B/L, CFT <3 seconds B/L, Temperature gradient warm to cool B/L  Neuro: Epicritic sensation diminished to the level of digits B/L  Musculoskeletal/Ortho: unremarkable   Skin: left foot hallux wound to subq w/ toenail absent, no probing to bone, no purulence, no fluctuance, no malodor, erythema 2/2 dependent rubor     RADIOLOGY & ADDITIONAL STUDIES:

## 2021-08-27 NOTE — ED PROVIDER NOTE - CLINICAL SUMMARY MEDICAL DECISION MAKING FREE TEXT BOX
89F PMH HTN, DM, dementia presents from podiatry clinic with foot wound. Will get sepsis labs, ESR/CRP, XR, podiatry eval, likely tba.

## 2021-08-28 LAB
ANION GAP SERPL CALC-SCNC: 18 MMOL/L — HIGH (ref 5–17)
APTT BLD: 139 SEC — CRITICAL HIGH (ref 27.5–35.5)
APTT BLD: 61.1 SEC — HIGH (ref 27.5–35.5)
BUN SERPL-MCNC: 32 MG/DL — HIGH (ref 7–23)
CALCIUM SERPL-MCNC: 9.5 MG/DL — SIGNIFICANT CHANGE UP (ref 8.4–10.5)
CHLORIDE SERPL-SCNC: 106 MMOL/L — SIGNIFICANT CHANGE UP (ref 96–108)
CO2 SERPL-SCNC: 18 MMOL/L — LOW (ref 22–31)
COVID-19 SPIKE DOMAIN AB INTERP: POSITIVE
COVID-19 SPIKE DOMAIN ANTIBODY RESULT: >250 U/ML — HIGH
CREAT SERPL-MCNC: 1.43 MG/DL — HIGH (ref 0.5–1.3)
GLUCOSE SERPL-MCNC: 86 MG/DL — SIGNIFICANT CHANGE UP (ref 70–99)
HCT VFR BLD CALC: 28.7 % — LOW (ref 34.5–45)
HGB BLD-MCNC: 9.2 G/DL — LOW (ref 11.5–15.5)
MCHC RBC-ENTMCNC: 29.8 PG — SIGNIFICANT CHANGE UP (ref 27–34)
MCHC RBC-ENTMCNC: 32.1 GM/DL — SIGNIFICANT CHANGE UP (ref 32–36)
MCV RBC AUTO: 92.9 FL — SIGNIFICANT CHANGE UP (ref 80–100)
NRBC # BLD: 0 /100 WBCS — SIGNIFICANT CHANGE UP (ref 0–0)
PLATELET # BLD AUTO: 303 K/UL — SIGNIFICANT CHANGE UP (ref 150–400)
POTASSIUM SERPL-MCNC: 4.8 MMOL/L — SIGNIFICANT CHANGE UP (ref 3.5–5.3)
POTASSIUM SERPL-SCNC: 4.8 MMOL/L — SIGNIFICANT CHANGE UP (ref 3.5–5.3)
RBC # BLD: 3.09 M/UL — LOW (ref 3.8–5.2)
RBC # FLD: 12.7 % — SIGNIFICANT CHANGE UP (ref 10.3–14.5)
SARS-COV-2 IGG+IGM SERPL QL IA: >250 U/ML — HIGH
SARS-COV-2 IGG+IGM SERPL QL IA: POSITIVE
SARS-COV-2 RNA SPEC QL NAA+PROBE: SIGNIFICANT CHANGE UP
SODIUM SERPL-SCNC: 142 MMOL/L — SIGNIFICANT CHANGE UP (ref 135–145)
WBC # BLD: 11.43 K/UL — HIGH (ref 3.8–10.5)
WBC # FLD AUTO: 11.43 K/UL — HIGH (ref 3.8–10.5)

## 2021-08-28 PROCEDURE — 99232 SBSQ HOSP IP/OBS MODERATE 35: CPT

## 2021-08-28 RX ADMIN — Medication 1 TABLET(S): at 11:25

## 2021-08-28 RX ADMIN — Medication 60 MILLIGRAM(S): at 05:15

## 2021-08-28 RX ADMIN — MEMANTINE HYDROCHLORIDE 5 MILLIGRAM(S): 10 TABLET ORAL at 17:57

## 2021-08-28 RX ADMIN — MEMANTINE HYDROCHLORIDE 5 MILLIGRAM(S): 10 TABLET ORAL at 05:16

## 2021-08-28 RX ADMIN — HEPARIN SODIUM 800 UNIT(S)/HR: 5000 INJECTION INTRAVENOUS; SUBCUTANEOUS at 23:53

## 2021-08-28 RX ADMIN — Medication 300 MILLIGRAM(S): at 14:52

## 2021-08-28 RX ADMIN — GABAPENTIN 200 MILLIGRAM(S): 400 CAPSULE ORAL at 23:52

## 2021-08-28 RX ADMIN — Medication 300 MILLIGRAM(S): at 05:17

## 2021-08-28 RX ADMIN — HEPARIN SODIUM 800 UNIT(S)/HR: 5000 INJECTION INTRAVENOUS; SUBCUTANEOUS at 10:35

## 2021-08-28 RX ADMIN — HEPARIN SODIUM 0 UNIT(S)/HR: 5000 INJECTION INTRAVENOUS; SUBCUTANEOUS at 09:33

## 2021-08-28 RX ADMIN — Medication 50 MILLIGRAM(S): at 23:51

## 2021-08-28 RX ADMIN — LOSARTAN POTASSIUM 50 MILLIGRAM(S): 100 TABLET, FILM COATED ORAL at 05:15

## 2021-08-28 RX ADMIN — Medication 112 MICROGRAM(S): at 05:15

## 2021-08-28 NOTE — PROGRESS NOTE ADULT - NSPROGADDITIONALINFOA_GEN_ALL_CORE
d/w ACP Antonieta    Attempted to call daughter to provide update but no answer.     Dispo: PT eval, will need aids re-instated if going home. Need to verify eliquis coverage and f/u cultures. Possibly Monday

## 2021-08-28 NOTE — PHYSICAL THERAPY INITIAL EVALUATION ADULT - ADDITIONAL COMMENTS
Venous Duplex: Acute deep venous thrombosis: above the knee involving the left external iliac, common femoral, and femoral veins.    Pt unable to provide social history, obtained from son at bedside. Pt lives in a home with 24/7 HHA, daughter lives down the street. No steps to negotiate. PTA, pt ambulated short distances with rolling walker with assistance from the aide.

## 2021-08-28 NOTE — PROGRESS NOTE ADULT - PROBLEM SELECTOR PLAN 1
Started on heparin gtt in the ED, continue. GFR 28.     Transition to Eliquis at discharge. Will need to confirm that eliquis is covered.

## 2021-08-28 NOTE — PROVIDER CONTACT NOTE (OTHER) - ACTION/TREATMENT ORDERED:
JAXON Hill made aware, will order stat aPTT for 5:30am. Will scan bag when those results come back. Will continue to monitor pt.

## 2021-08-28 NOTE — ED ADULT NURSE REASSESSMENT NOTE - NS ED NURSE REASSESS COMMENT FT1
repeat aPTT drawn and sent to lab. Result 80.8. No change to heparin dose necessary as per FULL ANTICOAGULATION heparin nomogram. No bolus needed to be given.

## 2021-08-28 NOTE — PROVIDER CONTACT NOTE (OTHER) - SITUATION
Heparin aPTT resulted @23:40, heparin bag not scanned at that time. Result was in therapeutic range.

## 2021-08-28 NOTE — PHYSICAL THERAPY INITIAL EVALUATION ADULT - PERTINENT HX OF CURRENT PROBLEM, REHAB EVAL
Pt is a 89 y/o F with PMH of HTN, DM (not on meds), dementia who was sent from podiatry clinic for toe wound. History obtained from daughter at bedside given h/o dementia, AOx1 baseline. Pt was first noted to have foot wound earlier this week at Nephrology clinic, sent to podiatry, who today noted a L 1st toe wound with drainage. Sent to ED for infectious and possible vascular eval.

## 2021-08-29 ENCOUNTER — TRANSCRIPTION ENCOUNTER (OUTPATIENT)
Age: 86
End: 2021-08-29

## 2021-08-29 LAB
-  AMIKACIN: SIGNIFICANT CHANGE UP
-  AMPICILLIN/SULBACTAM: SIGNIFICANT CHANGE UP
-  CEFEPIME: SIGNIFICANT CHANGE UP
-  CEFTAZIDIME: SIGNIFICANT CHANGE UP
-  CIPROFLOXACIN: SIGNIFICANT CHANGE UP
-  GENTAMICIN: SIGNIFICANT CHANGE UP
-  IMIPENEM: SIGNIFICANT CHANGE UP
-  LEVOFLOXACIN: SIGNIFICANT CHANGE UP
-  MEROPENEM: SIGNIFICANT CHANGE UP
-  PIPERACILLIN/TAZOBACTAM: SIGNIFICANT CHANGE UP
-  TOBRAMYCIN: SIGNIFICANT CHANGE UP
-  TRIMETHOPRIM/SULFAMETHOXAZOLE: SIGNIFICANT CHANGE UP
ANION GAP SERPL CALC-SCNC: 12 MMOL/L — SIGNIFICANT CHANGE UP (ref 5–17)
APTT BLD: 68.4 SEC — HIGH (ref 27.5–35.5)
BUN SERPL-MCNC: 40 MG/DL — HIGH (ref 7–23)
CALCIUM SERPL-MCNC: 9.2 MG/DL — SIGNIFICANT CHANGE UP (ref 8.4–10.5)
CHLORIDE SERPL-SCNC: 105 MMOL/L — SIGNIFICANT CHANGE UP (ref 96–108)
CO2 SERPL-SCNC: 22 MMOL/L — SIGNIFICANT CHANGE UP (ref 22–31)
CREAT SERPL-MCNC: 1.57 MG/DL — HIGH (ref 0.5–1.3)
CULTURE RESULTS: SIGNIFICANT CHANGE UP
GLUCOSE SERPL-MCNC: 96 MG/DL — SIGNIFICANT CHANGE UP (ref 70–99)
HCT VFR BLD CALC: 28.9 % — LOW (ref 34.5–45)
HGB BLD-MCNC: 9 G/DL — LOW (ref 11.5–15.5)
MCHC RBC-ENTMCNC: 29 PG — SIGNIFICANT CHANGE UP (ref 27–34)
MCHC RBC-ENTMCNC: 31.1 GM/DL — LOW (ref 32–36)
MCV RBC AUTO: 93.2 FL — SIGNIFICANT CHANGE UP (ref 80–100)
METHOD TYPE: SIGNIFICANT CHANGE UP
METHOD TYPE: SIGNIFICANT CHANGE UP
NRBC # BLD: 0 /100 WBCS — SIGNIFICANT CHANGE UP (ref 0–0)
ORGANISM # SPEC MICROSCOPIC CNT: SIGNIFICANT CHANGE UP
PLATELET # BLD AUTO: 325 K/UL — SIGNIFICANT CHANGE UP (ref 150–400)
POTASSIUM SERPL-MCNC: 4.1 MMOL/L — SIGNIFICANT CHANGE UP (ref 3.5–5.3)
POTASSIUM SERPL-SCNC: 4.1 MMOL/L — SIGNIFICANT CHANGE UP (ref 3.5–5.3)
RBC # BLD: 3.1 M/UL — LOW (ref 3.8–5.2)
RBC # FLD: 12.6 % — SIGNIFICANT CHANGE UP (ref 10.3–14.5)
SODIUM SERPL-SCNC: 139 MMOL/L — SIGNIFICANT CHANGE UP (ref 135–145)
SPECIMEN SOURCE: SIGNIFICANT CHANGE UP
WBC # BLD: 12.78 K/UL — HIGH (ref 3.8–10.5)
WBC # FLD AUTO: 12.78 K/UL — HIGH (ref 3.8–10.5)

## 2021-08-29 PROCEDURE — 99233 SBSQ HOSP IP/OBS HIGH 50: CPT

## 2021-08-29 RX ORDER — APIXABAN 2.5 MG/1
10 TABLET, FILM COATED ORAL
Refills: 0 | Status: DISCONTINUED | OUTPATIENT
Start: 2021-08-29 | End: 2021-08-30

## 2021-08-29 RX ADMIN — Medication 300 MILLIGRAM(S): at 06:56

## 2021-08-29 RX ADMIN — GABAPENTIN 200 MILLIGRAM(S): 400 CAPSULE ORAL at 22:15

## 2021-08-29 RX ADMIN — MEMANTINE HYDROCHLORIDE 5 MILLIGRAM(S): 10 TABLET ORAL at 17:41

## 2021-08-29 RX ADMIN — Medication 112 MICROGRAM(S): at 06:04

## 2021-08-29 RX ADMIN — MEMANTINE HYDROCHLORIDE 5 MILLIGRAM(S): 10 TABLET ORAL at 06:05

## 2021-08-29 RX ADMIN — Medication 300 MILLIGRAM(S): at 13:32

## 2021-08-29 RX ADMIN — Medication 300 MILLIGRAM(S): at 00:01

## 2021-08-29 RX ADMIN — APIXABAN 10 MILLIGRAM(S): 2.5 TABLET, FILM COATED ORAL at 17:41

## 2021-08-29 RX ADMIN — LOSARTAN POTASSIUM 50 MILLIGRAM(S): 100 TABLET, FILM COATED ORAL at 06:05

## 2021-08-29 RX ADMIN — Medication 50 MILLIGRAM(S): at 22:15

## 2021-08-29 RX ADMIN — Medication 60 MILLIGRAM(S): at 06:05

## 2021-08-29 RX ADMIN — Medication 1 TABLET(S): at 11:05

## 2021-08-29 RX ADMIN — HEPARIN SODIUM 800 UNIT(S)/HR: 5000 INJECTION INTRAVENOUS; SUBCUTANEOUS at 12:08

## 2021-08-29 NOTE — DISCHARGE NOTE PROVIDER - HOSPITAL COURSE
89 yo F PMH HTN, DM (not on meds), dementia sent from podiatry clinic for toe wound noted today. History obtained from daughter at bedside given h/o dementia, AOx1 baseline. Patient with left 1st toe found wound and drainage. patient also found to have acute DVT of the left external iliac, common femoral, and femoral veins.    Problem: DVT of lower limb, acute.   Plan: Started on heparin gtt in the ED, continue. GFR 28.     Transitioned to Eliquis 10 mg BID for 7 days than 5 mg BID for 3-6 month course  Given renal function and elderly age not ideal, but patient with significant DVT and needs to needs to be anticoagulated  Total cost approximately $200s, daughter aware and okay with it.      Problem: Wound of foot.   Plan: Seen by podiatry- - left foot hallux wound to subq w/ toenail absent, no probing to bone, no purulence, no fluctuance, no malodor, erythema 2/2 dependent rubor  left foot wound culture taken & growing pansensitive actinobacteria   left foot xray preliminary read showing no gas, no OM   no acute pod surgical intervention necessary  was on clindamycin for 2-3 days, will start Levofloxacin 500 mg q48 hours for two more doses which should approximate to ~7 day treatment course.    Problem: Hypertension.   Plan: Continue ARB, Nifedipine.  Problem: Dementia.   Plan: Continue Memantine.  Problem: Hypothyroidism.   Plan: Continue Levothyroxine.  DCP with med rec discussed with Dr Bautista is cleared for DC home with A   Follow up with Dr Santoyo 89 yo F PMH HTN, DM (not on meds), dementia sent from podiatry clinic for toe wound noted today. History obtained from daughter at bedside given h/o dementia, AOx1 baseline. Patient with left 1st toe found wound and drainage. patient also found to have acute DVT of the left external iliac, common femoral, and femoral veins.    Problem: DVT of lower limb, acute.   Plan: Started on heparin gtt in the ED, continue. GFR 28.     Transitioned to Eliquis 5 mg BID 3-6 month course  . due to age and risk of fall will decrease dose to 5 mgs instead of 10 mgs  Given renal function and elderly age not ideal, but patient with significant DVT and needs to needs to be anticoagulated  Total cost approximately $200s, daughter aware and okay with it.      Problem: Wound of foot.   Plan: Seen by podiatry- - left foot hallux wound to subq w/ toenail absent, no probing to bone, no purulence, no fluctuance, no malodor, erythema 2/2 dependent rubor  left foot wound culture taken & growing pansensitive actinobacteria   left foot xray preliminary read showing no gas, no OM   no acute pod surgical intervention necessary   will start Levofloxacin 500 mg q48 hours for two more doses which should approximate to ~7 day treatment course.    Problem: Hypertension.   Plan: Continue ARB, Nifedipine.  Problem: Dementia.   Plan: Continue Memantine.  Problem: Hypothyroidism.   Plan: Continue Levothyroxine.  DCP with med rec discussed with Dr Bautista is cleared for DC home with A   Follow up with Dr Santoyo

## 2021-08-29 NOTE — DISCHARGE NOTE PROVIDER - PROVIDER TOKENS
PROVIDER:[TOKEN:[[id number]]] PROVIDER:[TOKEN:[[id number]]],PROVIDER:[TOKEN:[[id number]]]

## 2021-08-29 NOTE — PROGRESS NOTE ADULT - NSPROGADDITIONALINFOA_GEN_ALL_CORE
Heath Dumont  Pager: [phone]  Off-Hours: [phone]  Available on MS Teams Likely discharge on 8/30 with one more dose of Levofloxacin and Eliquis. Patient with 24 hour HHA, needs to be reinstated. PT recommending home with home PT.    Heath Dumont  Pager: [phone]  Off-Hours: [phone]  Available on MS Teams

## 2021-08-29 NOTE — DISCHARGE NOTE PROVIDER - NSDCCPCAREPLAN_GEN_ALL_CORE_FT
PRINCIPAL DISCHARGE DIAGNOSIS  Diagnosis: DVT, lower extremity  Assessment and Plan of Treatment: Take your "blood thinners" as prescribed.  Walking is encouraged, increase activity as tolerated.  If you develop new leg pain, swelling, and/or redness contact your healthcare provider.  If you develop new chest pain with difficulty breathing, a rapid heart          PRINCIPAL DISCHARGE DIAGNOSIS  Diagnosis: DVT, lower extremity  Assessment and Plan of Treatment: Take your "blood thinners" as prescribed. PLEASE FOLLOW UP WITH YOUR PMD FOR ONGOING MANAGMENT OF DVT , YOU HAVE RECIEVED A 1 MONTH SUPPLY BUT YOU WILL NEEED 3-6 MONTHS TREATMENT.   Walking is encouraged, increase activity as tolerated.  If you develop new leg pain, swelling, and/or redness contact your healthcare provider.  If you develop new chest pain with difficulty breathing, a rapid heart         SECONDARY DISCHARGE DIAGNOSES  Diagnosis: Wound of foot  Assessment and Plan of Treatment: left foot hallux wound to subq  - pt seen and evaluated  - soft tissue infection , levaquin last dose on 9/1 then d/c  - no acute pod surgical intervention necessary  -  stable for discharge from podiatry standpoint to f/u outpatient w/ Dr. Zaragoza/Dr. Augustin within 1 week, call [phone] for appointment      Diagnosis: Hypothyroidism  Assessment and Plan of Treatment: c/w synthyroid and monitor tsh level    Diagnosis: Hypertension  Assessment and Plan of Treatment: C/W IBERSARTAN AND F/UP WITH YOUR PMD    Diagnosis: Dementia  Assessment and Plan of Treatment: c/w nemantine with supportive care

## 2021-08-29 NOTE — DISCHARGE NOTE PROVIDER - CARE PROVIDER_API CALL
Roque Santoyo)  Internal Medicine  [address] [place] [id number]  Phone: [phone]  Fax: [phone]  Follow Up Time:    Roque Santoyo)  Internal Medicine  [address] [id number]  Phone: [phone]  Fax: [phone]  Follow Up Time:     Anastasiia Zaragoza (DPM)  Surgery  [address] [facility], [address]  [place] [address]  Phone: [phone]  Follow Up Time:

## 2021-08-29 NOTE — PROGRESS NOTE ADULT - PROBLEM SELECTOR PLAN 1
Started on heparin gtt in the ED, continue. GFR 28.     Transitioned to Eliquis 10 mg BID for 7 days than 5 mg BID for 3-6 month course  Given renal function and elderly age not ideal, but patient with significant DVT and needs to needs to be anticoagulated  Total cost approximately $200s, daughter aware and okay with it

## 2021-08-29 NOTE — DISCHARGE NOTE PROVIDER - NSDCMRMEDTOKEN_GEN_ALL_CORE_FT
Acidophilus oral tablet: 1 tab(s) orally once a day  donepezil 10 mg oral tablet: 1 tab(s) orally once a day  gabapentin 100 mg oral capsule: 2 cap(s) orally once a day (at bedtime)  irbesartan 150 mg oral tablet: 1 tab(s) orally once a day  levothyroxine 112 mcg (0.112 mg) oral tablet: 1 tab(s) orally once a day  memantine 10 mg oral tablet: 0.5 tab(s) orally 2 times a day  Multiple Vitamins oral tablet: 1 tab(s) orally once a day  NIFEdipine 60 mg oral tablet, extended release: 1 tab(s) orally once a day  traZODone 50 mg oral tablet: 1 tab(s) orally once a day (at bedtime)   Acidophilus oral tablet: 1 tab(s) orally once a day  apixaban 5 mg oral tablet: 1 tab(s) orally 2 times a day   donepezil 10 mg oral tablet: 1 tab(s) orally once a day  gabapentin 100 mg oral capsule: 2 cap(s) orally once a day (at bedtime)  irbesartan 150 mg oral tablet: 1 tab(s) orally once a day  levoFLOXacin 500 mg oral tablet: 1 tab(s) orally every 48 hours  levothyroxine 112 mcg (0.112 mg) oral tablet: 1 tab(s) orally once a day  memantine 5 mg oral tablet: 1 tab(s) orally 2 times a day  Multiple Vitamins oral tablet: 1 tab(s) orally once a day  NIFEdipine 60 mg oral tablet, extended release: 1 tab(s) orally once a day  traZODone 50 mg oral tablet: 1 tab(s) orally once a day (at bedtime)

## 2021-08-30 ENCOUNTER — TRANSCRIPTION ENCOUNTER (OUTPATIENT)
Age: 86
End: 2021-08-30

## 2021-08-30 VITALS
HEART RATE: 82 BPM | DIASTOLIC BLOOD PRESSURE: 58 MMHG | OXYGEN SATURATION: 96 % | TEMPERATURE: 99 F | SYSTOLIC BLOOD PRESSURE: 105 MMHG | RESPIRATION RATE: 18 BRPM

## 2021-08-30 LAB
ANION GAP SERPL CALC-SCNC: 12 MMOL/L — SIGNIFICANT CHANGE UP (ref 5–17)
APTT BLD: 25.8 SEC — LOW (ref 27.5–35.5)
BUN SERPL-MCNC: 37 MG/DL — HIGH (ref 7–23)
CALCIUM SERPL-MCNC: 9.1 MG/DL — SIGNIFICANT CHANGE UP (ref 8.4–10.5)
CHLORIDE SERPL-SCNC: 107 MMOL/L — SIGNIFICANT CHANGE UP (ref 96–108)
CO2 SERPL-SCNC: 21 MMOL/L — LOW (ref 22–31)
CREAT SERPL-MCNC: 1.81 MG/DL — HIGH (ref 0.5–1.3)
GLUCOSE SERPL-MCNC: 85 MG/DL — SIGNIFICANT CHANGE UP (ref 70–99)
HCT VFR BLD CALC: 27.9 % — LOW (ref 34.5–45)
HGB BLD-MCNC: 8.8 G/DL — LOW (ref 11.5–15.5)
MCHC RBC-ENTMCNC: 29.3 PG — SIGNIFICANT CHANGE UP (ref 27–34)
MCHC RBC-ENTMCNC: 31.5 GM/DL — LOW (ref 32–36)
MCV RBC AUTO: 93 FL — SIGNIFICANT CHANGE UP (ref 80–100)
NRBC # BLD: 0 /100 WBCS — SIGNIFICANT CHANGE UP (ref 0–0)
PLATELET # BLD AUTO: 287 K/UL — SIGNIFICANT CHANGE UP (ref 150–400)
POTASSIUM SERPL-MCNC: 4 MMOL/L — SIGNIFICANT CHANGE UP (ref 3.5–5.3)
POTASSIUM SERPL-SCNC: 4 MMOL/L — SIGNIFICANT CHANGE UP (ref 3.5–5.3)
RBC # BLD: 3 M/UL — LOW (ref 3.8–5.2)
RBC # FLD: 12.8 % — SIGNIFICANT CHANGE UP (ref 10.3–14.5)
SODIUM SERPL-SCNC: 140 MMOL/L — SIGNIFICANT CHANGE UP (ref 135–145)
WBC # BLD: 11.81 K/UL — HIGH (ref 3.8–10.5)
WBC # FLD AUTO: 11.81 K/UL — HIGH (ref 3.8–10.5)

## 2021-08-30 PROCEDURE — 85014 HEMATOCRIT: CPT

## 2021-08-30 PROCEDURE — 80048 BASIC METABOLIC PNL TOTAL CA: CPT

## 2021-08-30 PROCEDURE — 87184 SC STD DISK METHOD PER PLATE: CPT

## 2021-08-30 PROCEDURE — 86769 SARS-COV-2 COVID-19 ANTIBODY: CPT

## 2021-08-30 PROCEDURE — 82330 ASSAY OF CALCIUM: CPT

## 2021-08-30 PROCEDURE — U0005: CPT

## 2021-08-30 PROCEDURE — 84132 ASSAY OF SERUM POTASSIUM: CPT

## 2021-08-30 PROCEDURE — 97161 PT EVAL LOW COMPLEX 20 MIN: CPT

## 2021-08-30 PROCEDURE — 84295 ASSAY OF SERUM SODIUM: CPT

## 2021-08-30 PROCEDURE — 87040 BLOOD CULTURE FOR BACTERIA: CPT

## 2021-08-30 PROCEDURE — 82962 GLUCOSE BLOOD TEST: CPT

## 2021-08-30 PROCEDURE — 86140 C-REACTIVE PROTEIN: CPT

## 2021-08-30 PROCEDURE — 87070 CULTURE OTHR SPECIMN AEROBIC: CPT

## 2021-08-30 PROCEDURE — 85730 THROMBOPLASTIN TIME PARTIAL: CPT

## 2021-08-30 PROCEDURE — 93970 EXTREMITY STUDY: CPT

## 2021-08-30 PROCEDURE — 87205 SMEAR GRAM STAIN: CPT

## 2021-08-30 PROCEDURE — 83735 ASSAY OF MAGNESIUM: CPT

## 2021-08-30 PROCEDURE — 87186 SC STD MICRODIL/AGAR DIL: CPT

## 2021-08-30 PROCEDURE — 85018 HEMOGLOBIN: CPT

## 2021-08-30 PROCEDURE — 83605 ASSAY OF LACTIC ACID: CPT

## 2021-08-30 PROCEDURE — 82947 ASSAY GLUCOSE BLOOD QUANT: CPT

## 2021-08-30 PROCEDURE — 80053 COMPREHEN METABOLIC PANEL: CPT

## 2021-08-30 PROCEDURE — 99239 HOSP IP/OBS DSCHRG MGMT >30: CPT

## 2021-08-30 PROCEDURE — 87077 CULTURE AEROBIC IDENTIFY: CPT

## 2021-08-30 PROCEDURE — 82803 BLOOD GASES ANY COMBINATION: CPT

## 2021-08-30 PROCEDURE — 84100 ASSAY OF PHOSPHORUS: CPT

## 2021-08-30 PROCEDURE — 82435 ASSAY OF BLOOD CHLORIDE: CPT

## 2021-08-30 PROCEDURE — 99285 EMERGENCY DEPT VISIT HI MDM: CPT | Mod: 25

## 2021-08-30 PROCEDURE — U0003: CPT

## 2021-08-30 PROCEDURE — 85027 COMPLETE CBC AUTOMATED: CPT

## 2021-08-30 PROCEDURE — 85652 RBC SED RATE AUTOMATED: CPT

## 2021-08-30 PROCEDURE — 73620 X-RAY EXAM OF FOOT: CPT

## 2021-08-30 RX ORDER — APIXABAN 2.5 MG/1
1 TABLET, FILM COATED ORAL
Qty: 60 | Refills: 0
Start: 2021-08-30 | End: 2021-09-28

## 2021-08-30 RX ORDER — MEMANTINE HYDROCHLORIDE 10 MG/1
1 TABLET ORAL
Qty: 0 | Refills: 0 | DISCHARGE
Start: 2021-08-30

## 2021-08-30 RX ORDER — MEMANTINE HYDROCHLORIDE 10 MG/1
0.5 TABLET ORAL
Qty: 0 | Refills: 0 | DISCHARGE

## 2021-08-30 RX ADMIN — Medication 1 TABLET(S): at 13:14

## 2021-08-30 RX ADMIN — APIXABAN 10 MILLIGRAM(S): 2.5 TABLET, FILM COATED ORAL at 06:27

## 2021-08-30 RX ADMIN — MEMANTINE HYDROCHLORIDE 5 MILLIGRAM(S): 10 TABLET ORAL at 06:27

## 2021-08-30 RX ADMIN — Medication 112 MICROGRAM(S): at 06:28

## 2021-08-30 RX ADMIN — Medication 60 MILLIGRAM(S): at 06:27

## 2021-08-30 RX ADMIN — LOSARTAN POTASSIUM 50 MILLIGRAM(S): 100 TABLET, FILM COATED ORAL at 06:28

## 2021-08-30 NOTE — PROGRESS NOTE ADULT - PROBLEM SELECTOR PLAN 1
Started on heparin gtt in the ED, continue. GFR 28.     Transitioned to Eliquis 10 mg BID for 7 days than 5 mg BID for 3-6 month course  Given renal function and elderly age not ideal, but patient with significant DVT and needs to needs to be anticoagulated  Total cost approximately $200s, daughter aware and okay with it Started on heparin gtt in the ED, continue. GFR 28.     Transitioned to Eliquis- 5 mg BID for 3-6 month course  Given renal function and elderly age not ideal, but patient with significant DVT and needs to needs to be anticoagulated  Total cost approximately $200s, daughter aware and okay with it

## 2021-08-30 NOTE — DISCHARGE NOTE NURSING/CASE MANAGEMENT/SOCIAL WORK - NSDCPEFALRISK_GEN_ALL_CORE
For information on Fall & injury Prevention, visit [email or web address]

## 2021-08-30 NOTE — PROGRESS NOTE ADULT - SUBJECTIVE AND OBJECTIVE BOX
Patient is a 89y old  Female who presents with a chief complaint of Leg swelling (29 Aug 2021 17:26)      SUBJECTIVE / OVERNIGHT EVENTS: Pt doing well. Awake, pleasant, denies pain.     Vital Signs Last 24 Hrs  T(C): 37.2 (30 Aug 2021 08:15), Max: 37.3 (29 Aug 2021 23:45)  T(F): 98.9 (30 Aug 2021 08:15), Max: 99.2 (29 Aug 2021 23:45)  HR: 82 (30 Aug 2021 08:15) (82 - 105)  BP: 105/58 (30 Aug 2021 08:15) (105/58 - 165/65)  BP(mean): --  RR: 18 (30 Aug 2021 08:15) (18 - 18)  SpO2: 96% (30 Aug 2021 08:15) (95% - 97%)    MEDICATIONS  (STANDING):  apixaban 10 milliGRAM(s) Oral two times a day  gabapentin 200 milliGRAM(s) Oral at bedtime  levoFLOXacin  Tablet 500 milliGRAM(s) Oral every 48 hours  levothyroxine 112 MICROGram(s) Oral daily  losartan 50 milliGRAM(s) Oral daily  memantine 5 milliGRAM(s) Oral two times a day  multivitamin 1 Tablet(s) Oral daily  NIFEdipine XL 60 milliGRAM(s) Oral daily  traZODone 50 milliGRAM(s) Oral at bedtime    MEDICATIONS  (PRN):        CAPILLARY BLOOD GLUCOSE        I&O's Summary    29 Aug 2021 07:01  -  30 Aug 2021 07:00  --------------------------------------------------------  IN: 380 mL / OUT: 0 mL / NET: 380 mL        PHYSICAL EXAM:  GENERAL: NAD, well-developed  HEAD:  Atraumatic, Normocephalic  EYES: EOMI, PERRLA, conjunctiva and sclera clear  NECK: Supple, No JVD  CHEST/LUNG: Clear to auscultation bilaterally; No wheeze  HEART: Regular rate and rhythm; No murmurs, rubs, or gallops  ABDOMEN: Soft, Nontender, Nondistended; Bowel sounds present  EXTREMITIES:  2+ Peripheral Pulses, No clubbing, cyanosis, or edema  PSYCH: AAOx3  NEUROLOGY: non-focal  SKIN: No rashes or lesions    LABS:                        8.8    11.81 )-----------( 287      ( 30 Aug 2021 06:48 )             27.9     08-30    140  |  107  |  37<H>  ----------------------------<  85  4.0   |  21<L>  |  1.81<H>    Ca    9.1      30 Aug 2021 06:48      PTT - ( 30 Aug 2021 06:48 )  PTT:25.8 sec          RADIOLOGY & ADDITIONAL TESTS:    Imaging Personally Reviewed:    Consultant(s) Notes Reviewed:      Care Discussed with Consultants/Other Providers: NP  
Patient is a 89y old  Female who presents with a chief complaint of Leg swelling (29 Aug 2021 04:00)      SUBJECTIVE / OVERNIGHT EVENTS: Patient seen and examined at bedside. No acute events overnight. She seems pleasantly demented, but has no complaints    MEDICATIONS  (STANDING):  apixaban 10 milliGRAM(s) Oral two times a day  gabapentin 200 milliGRAM(s) Oral at bedtime  levothyroxine 112 MICROGram(s) Oral daily  losartan 50 milliGRAM(s) Oral daily  memantine 5 milliGRAM(s) Oral two times a day  multivitamin 1 Tablet(s) Oral daily  NIFEdipine XL 60 milliGRAM(s) Oral daily  traZODone 50 milliGRAM(s) Oral at bedtime    MEDICATIONS  (PRN):    CAPILLARY BLOOD GLUCOSE    I&O's Summary    28 Aug 2021 07:01  -  29 Aug 2021 07:00  --------------------------------------------------------  IN: 656 mL / OUT: 0 mL / NET: 656 mL    29 Aug 2021 07:01  -  29 Aug 2021 15:48  --------------------------------------------------------  IN: 380 mL / OUT: 0 mL / NET: 380 mL        PHYSICAL EXAM:  Vital Signs Last 24 Hrs  T(C): 36.8 (29 Aug 2021 08:52), Max: 36.9 (29 Aug 2021 00:45)  T(F): 98.2 (29 Aug 2021 08:52), Max: 98.4 (29 Aug 2021 00:45)  HR: 74 (29 Aug 2021 08:52) (74 - 99)  BP: 128/67 (29 Aug 2021 08:52) (128/67 - 159/76)  BP(mean): --  RR: 18 (29 Aug 2021 08:52) (18 - 18)  SpO2: 96% (29 Aug 2021 08:52) (95% - 97%)    GEN: female in NAD, appears comfortable, no diaphoresis  EYES: No scleral injection, PERRL, EOMI  ENTM: neck supple & symmetric without tracheal deviation, moist membranes, no gross hearing impairment, thyroid gland not enlarged  CV: +S1/S2, no m/r/g, no abdominal bruit, no LE edema  RESP: breathing comfortably, no respiratory accessory muscle use, CTAB, no w/r/r  GI: normoactive BS, soft, NTND, no rebounding/guarding, no palpable masses    LABS:                        9.0    12.78 )-----------( 325      ( 29 Aug 2021 07:51 )             28.9     08-29    139  |  105  |  40<H>  ----------------------------<  96  4.1   |  22  |  1.57<H>    Ca    9.2      29 Aug 2021 07:51      PTT - ( 29 Aug 2021 07:51 )  PTT:68.4 sec          Culture - Abscess with Gram Stain (collected 27 Aug 2021 15:22)  Source: .Abscess left foot wound  Final Report (29 Aug 2021 14:33):    After additional incubation time, Culture yields >4 types of aerobic    and/or anaerobic bacteria    Call client services within 7 days if further workup is clinically    indicated. Culture includes    Moderate Most closely resembling Acinetobacter species  Organism: Acinetobacter species (29 Aug 2021 14:33)  Organism: Acinetobacter species (29 Aug 2021 14:33)  Organism: Acinetobacter species (29 Aug 2021 14:33)    Culture - Blood (collected 27 Aug 2021 14:57)  Source: .Blood Blood-Peripheral  Preliminary Report (28 Aug 2021 15:05):    No growth to date.    Culture - Blood (collected 27 Aug 2021 14:57)  Source: .Blood Blood-Peripheral  Preliminary Report (28 Aug 2021 15:05):    No growth to date.        RADIOLOGY & ADDITIONAL TESTS:  Results Reviewed:   Imaging Personally Reviewed:  Electrocardiogram Personally Reviewed:    COORDINATION OF CARE:  Care Discussed with Consultants/Other Providers [Y/N]:  Prior or Outpatient Records Reviewed [Y/N]:  
[facility] Division of Hospital Medicine  Ariela Mcmillan DO pager [phone]    Patient is a 89y old  Female who presents with a chief complaint of Leg swelling (27 Aug 2021 17:57)      SUBJECTIVE / OVERNIGHT EVENTS:  No acute overnight events. Afebrile, no acute complaints.   Patient thinks she is doing well, Aox1. No pain, SOB, CP, N/V/D.   ADDITIONAL REVIEW OF SYSTEMS:    MEDICATIONS  (STANDING):  clindamycin   Capsule 300 milliGRAM(s) Oral three times a day  gabapentin 200 milliGRAM(s) Oral at bedtime  heparin  Infusion.  Unit(s)/Hr (10 mL/Hr) IV Continuous <Continuous>  levothyroxine 112 MICROGram(s) Oral daily  losartan 50 milliGRAM(s) Oral daily  memantine 5 milliGRAM(s) Oral two times a day  multivitamin 1 Tablet(s) Oral daily  NIFEdipine XL 60 milliGRAM(s) Oral daily  traZODone 50 milliGRAM(s) Oral at bedtime    MEDICATIONS  (PRN):  heparin   Injectable 4000 Unit(s) IV Push every 6 hours PRN For aPTT less than 40  heparin   Injectable 2000 Unit(s) IV Push every 6 hours PRN For aPTT between 40 - 57      CAPILLARY BLOOD GLUCOSE        I&O's Summary    28 Aug 2021 07:01  -  28 Aug 2021 14:15  --------------------------------------------------------  IN: 560 mL / OUT: 0 mL / NET: 560 mL        PHYSICAL EXAM:  Vital Signs Last 24 Hrs  T(C): 36.7 (28 Aug 2021 08:46), Max: 36.9 (27 Aug 2021 23:06)  T(F): 98.1 (28 Aug 2021 08:46), Max: 98.4 (27 Aug 2021 23:06)  HR: 73 (28 Aug 2021 12:00) (73 - 97)  BP: 159/64 (28 Aug 2021 12:00) (159/56 - 197/66)  BP(mean): --  RR: 18 (28 Aug 2021 08:46) (16 - 18)  SpO2: 99% (28 Aug 2021 12:00) (96% - 99%)    CONSTITUTIONAL: NAD, well-groomed, thin   EYES: PERRL; conjunctiva and sclera clear  ENMT: Moist oral mucosa  RESPIRATORY: Normal respiratory effort; lungs are clear to auscultation bilaterally  CARDIOVASCULAR: Regular rate and rhythm, normal S1 and S2; No lower extremity edema  ABDOMEN: Nontender to palpation, normoactive bowel sounds, no rebound/guarding  MUSCULOSKELETAL:  no clubbing or cyanosis of digits; no joint swelling or tenderness to palpation  PSYCH: A+O to person; affect appropriate  NEUROLOGY: moving all extremities   SKIN: L foot bandaged. Erythema at ankle and lower leg    LABS:                        9.2    11.43 )-----------( 303      ( 28 Aug 2021 08:51 )             28.7     08-28    142  |  106  |  32<H>  ----------------------------<  86  4.8   |  18<L>  |  1.43<H>    Ca    9.5      28 Aug 2021 07:13  Phos  3.4     08-27  Mg     2.2     08-27    TPro  7.5  /  Alb  3.8  /  TBili  0.2  /  DBili  x   /  AST  21  /  ALT  10  /  AlkPhos  103  08-27    PTT - ( 28 Aug 2021 08:51 )  PTT:139.0 sec            RADIOLOGY & ADDITIONAL TESTS:  Results Reviewed:   Imaging Personally Reviewed:  Electrocardiogram Personally Reviewed:    COORDINATION OF CARE:  Care Discussed with Consultants/Other Providers [Y/N]:  Prior or Outpatient Records Reviewed [Y/N]:

## 2021-08-30 NOTE — PROGRESS NOTE ADULT - PROBLEM SELECTOR PLAN 2
Seen by podiatry- - left foot hallux wound to subq w/ toenail absent, no probing to bone, no purulence, no fluctuance, no malodor, erythema 2/2 dependent rubor  - left foot wound culture taken & growing pansensitive actinobacteria   - left foot xray preliminary read showing no gas, no OM   - no acute pod surgical intervention necessary  - was on clindamycin for 2-3 days, will start Levofloxacin 500 mg q48 hours for two more doses which should approximate to ~7 day treatment course
Seen by podiatry- - left foot hallux wound to subq w/ toenail absent, no probing to bone, no purulence, no fluctuance, no malodor, erythema 2/2 dependent rubor  - left foot wound culture taken, still in the lab  - f/u blood cultures   - left foot xray preliminary read showing no gas, no OM   - no acute pod surgical intervention necessary  - recommend d/c on PO Cl[place] for 1 week
Seen by podiatry- - left foot hallux wound to subq w/ toenail absent, no probing to bone, no purulence, no fluctuance, no malodor, erythema 2/2 dependent rubor  - left foot wound culture taken & growing pansensitive actinobacteria   - left foot xray preliminary read showing no gas, no OM   - no acute pod surgical intervention necessary  - was on clindamycin for 2-3 days, now on Levofloxacin 500 mg q48 hours for two more doses which should approximate to ~7 day treatment course

## 2021-08-30 NOTE — DISCHARGE NOTE NURSING/CASE MANAGEMENT/SOCIAL WORK - PATIENT PORTAL LINK FT
You can access the FollowMyHealth Patient Portal offered by [facility] by registering at the following website: [email or web address]. By joining [employer]’s FollowMyHealth portal, you will also be able to view your health information using other applications (apps) compatible with our system.

## 2021-08-30 NOTE — PROGRESS NOTE ADULT - ASSESSMENT
89 yo F PMH HTN, DM (not on meds), dementia sent from podiatry clinic for toe wound noted today. History obtained from daughter at bedside given h/o dementia, AOx1 baseline.     Pt was first noted to have foot wound earlier this week at Nephrology clinic, sent to podiatry, who today noted a L 1st toe wound with drainage. Sent to ED for infectious and possible vascular eval.     Per daughter, pt lives at home w 24 hr aides and can ambulate w a walker. Pt has not had any recent fever, altered mental status and has not complained of pain. No reported diarrhea, vomiting or dyspnea.     LE doppler in ED revealed acute deep venous thrombosis: above the knee involving the left external iliac, common femoral, and femoral veins.    
89 yo F PMH HTN, DM (not on meds), dementia sent from podiatry clinic for toe wound noted today. History obtained from daughter at bedside given h/o dementia, AOx1 baseline. Patient with left 1st toe found wound and drainage. patient also found to have acute DVT of the left external iliac, common femoral, and femoral veins.    
89 yo F PMH HTN, DM (not on meds), dementia sent from podiatry clinic for toe wound noted today. History obtained from daughter at bedside given h/o dementia, AOx1 baseline. Patient with left 1st toe found wound and drainage. patient also found to have acute DVT of the left external iliac, common femoral, and femoral veins.

## 2021-08-31 ENCOUNTER — NON-APPOINTMENT (OUTPATIENT)
Age: 86
End: 2021-08-31

## 2021-08-31 PROBLEM — F03.90 UNSPECIFIED DEMENTIA WITHOUT BEHAVIORAL DISTURBANCE: Chronic | Status: ACTIVE | Noted: 2021-08-27

## 2021-08-31 PROBLEM — E11.9 TYPE 2 DIABETES MELLITUS WITHOUT COMPLICATIONS: Chronic | Status: ACTIVE | Noted: 2021-08-27

## 2021-08-31 PROBLEM — I10 ESSENTIAL (PRIMARY) HYPERTENSION: Chronic | Status: ACTIVE | Noted: 2021-08-27

## 2021-09-01 ENCOUNTER — APPOINTMENT (OUTPATIENT)
Dept: GERIATRICS | Facility: CLINIC | Age: 86
End: 2021-09-01

## 2021-09-01 ENCOUNTER — NON-APPOINTMENT (OUTPATIENT)
Age: 86
End: 2021-09-01

## 2021-09-01 LAB
CULTURE RESULTS: SIGNIFICANT CHANGE UP
CULTURE RESULTS: SIGNIFICANT CHANGE UP
SPECIMEN SOURCE: SIGNIFICANT CHANGE UP
SPECIMEN SOURCE: SIGNIFICANT CHANGE UP

## 2021-09-07 ENCOUNTER — APPOINTMENT (OUTPATIENT)
Dept: ULTRASOUND IMAGING | Facility: IMAGING CENTER | Age: 86
End: 2021-09-07

## 2021-09-08 ENCOUNTER — APPOINTMENT (OUTPATIENT)
Dept: GERIATRICS | Facility: CLINIC | Age: 86
End: 2021-09-08
Payer: MEDICARE

## 2021-09-08 VITALS
SYSTOLIC BLOOD PRESSURE: 118 MMHG | RESPIRATION RATE: 15 BRPM | TEMPERATURE: 97.6 F | DIASTOLIC BLOOD PRESSURE: 50 MMHG | WEIGHT: 118.25 LBS | BODY MASS INDEX: 16.02 KG/M2 | OXYGEN SATURATION: 96 % | HEIGHT: 72 IN | HEART RATE: 87 BPM

## 2021-09-08 DIAGNOSIS — S91.109A UNSPECIFIED OPEN WOUND OF UNSPECIFIED TOE(S) W/OUT DAMAGE TO NAIL, INITIAL ENCOUNTER: ICD-10-CM

## 2021-09-08 DIAGNOSIS — S41.109A UNSPECIFIED OPEN WOUND OF UNSPECIFIED UPPER ARM, INITIAL ENCOUNTER: ICD-10-CM

## 2021-09-08 PROCEDURE — 99495 TRANSJ CARE MGMT MOD F2F 14D: CPT | Mod: GC

## 2021-09-08 NOTE — PHYSICAL EXAM
[Alert] : alert [No Acute Distress] : in no acute distress [Sclera] : the sclera and conjunctiva were normal [EOMI] : extraocular movements were intact [PERRL] : pupils were equal in size, round, and reactive to light [Normal Outer Ear/Nose] : the ears and nose were normal in appearance [Normal Appearance] : the appearance of the neck was normal [Supple] : the neck was supple [No Respiratory Distress] : no respiratory distress [No Acc Muscle Use] : no accessory muscle use [Respiration, Rhythm And Depth] : normal respiratory rhythm and effort [Auscultation Breath Sounds / Voice Sounds] : lungs were clear to auscultation bilaterally [Normal S1, S2] : normal S1 and S2 [Heart Rate And Rhythm] : heart rate was normal and rhythm regular [Pedal Pulses Normal] : the pedal pulses are present [Bowel Sounds] : normal bowel sounds [Abdomen Tenderness] : non-tender [Abdomen Soft] : soft [No Spinal Tenderness] : no spinal tenderness [Involuntary Movements] : no involuntary movements were seen [No Clubbing, Cyanosis] : no clubbing or cyanosis of the fingernails [Motor Tone] : muscle strength and tone were normal [No Focal Deficits] : no focal deficits [Normal Affect] : the affect was normal [Normal Mood] : the mood was normal [de-identified] : Trace LE edema [de-identified] : Wound on Right UE and great toe of left foot, toenail absent

## 2021-09-08 NOTE — HISTORY OF PRESENT ILLNESS
[FreeTextEntry1] : 89 y/o female. PMH of dementia, CKD, , DM (not on medications), Hypothyroidism, anemia of CKD. Comes to the clinic for a follow-up after being discharge from the hospital on 8/30/21. Patient is here with her daughter and home health aide.\par \par Patient was recently hospitalized to [facility] on 8/27/21. Was referred to go ther by the podiatry clinic secondary to a wound on her toe, left 1st toe found with a wound and drainage, in addition it was found for her to have an acute DVT of the left  external iliac, common femoral and femoral veins. Was initally started on heparin ggt and transitioned to Eliquis 10 mg po BID, but was reduced to 5 mg po BID due to comorbidities such as her CKD was reduced to 5 mg po BID for a total duration of 3-6 months. Regarding the wound on her toe, the left foot hallux w/ toenail absent, no probing to bone, no purulence, no fluctuance, no malodor, erythema 2/2 dependent rubor. Left foot wound culture taken & growing pansensitive actinobacteria. left foot xray preliminary read showing no gas, no OM. No acute pod surgical intervention necessary. Patient was started on Levofloxacin 500 mg q48 hours for two more doses which should approximate to ~7 day treatment course. Pt was cleared for DC home with HHA. To f/u outpatient w/ Dr. Zaragoza/Dr. Augustin (#[phone])\par \par Patient's daughter states that the wound has been taken care by a wound team that goes at home, she gets Betadine applied and then cover with gauze. Upon toe inspection, it is noticed that there is delayed healing, as per the daughter the wound looks improved compared to before and in addition there is no more redness. No fevers, chills, chest pain or SOB noted. She also stated that the patient's left leg was very cold before hospitalization and now it has become warmer. Both legs has presence of trace swelling, and on closer examination it is noticed discoloration at the bottom of the toes of both feet. The daughter also mentioned that they were schedule as per vascular to get imaging testing done to the pelvis and lower extremities but had to be reschedule further this week and they have a follow up appointment this Friday with the podiatrist. Daughter also mentioned her mother had a wound on her right upper extremity that was caused after removal of IV and was also being managed by wound care. \par \par

## 2021-09-08 NOTE — END OF VISIT
[] : Fellow [FreeTextEntry3] : 89yoF with dementia with DVT and infection of toe, ckd - plan as detailed above.

## 2021-09-15 ENCOUNTER — NON-APPOINTMENT (OUTPATIENT)
Age: 86
End: 2021-09-15

## 2021-11-30 ENCOUNTER — APPOINTMENT (OUTPATIENT)
Dept: GERIATRICS | Facility: CLINIC | Age: 86
End: 2021-11-30
Payer: MEDICARE

## 2021-11-30 ENCOUNTER — LABORATORY RESULT (OUTPATIENT)
Age: 86
End: 2021-11-30

## 2021-11-30 VITALS
HEIGHT: 61 IN | WEIGHT: 117.13 LBS | SYSTOLIC BLOOD PRESSURE: 110 MMHG | BODY MASS INDEX: 22.11 KG/M2 | OXYGEN SATURATION: 94 % | DIASTOLIC BLOOD PRESSURE: 58 MMHG | RESPIRATION RATE: 16 BRPM | HEART RATE: 81 BPM | TEMPERATURE: 97.2 F

## 2021-11-30 DIAGNOSIS — R39.9 UNSPECIFIED SYMPTOMS AND SIGNS INVOLVING THE GENITOURINARY SYSTEM: ICD-10-CM

## 2021-11-30 PROCEDURE — 99214 OFFICE O/P EST MOD 30 MIN: CPT

## 2021-11-30 NOTE — PHYSICAL EXAM
[Alert] : alert [No Acute Distress] : in no acute distress [Sclera] : the sclera and conjunctiva were normal [Normal Outer Ear/Nose] : the ears and nose were normal in appearance [Normal Appearance] : the appearance of the neck was normal [Supple] : the neck was supple [No Respiratory Distress] : no respiratory distress [No Acc Muscle Use] : no accessory muscle use [Respiration, Rhythm And Depth] : normal respiratory rhythm and effort [Auscultation Breath Sounds / Voice Sounds] : lungs were clear to auscultation bilaterally [Heart Rate And Rhythm] : heart rate was normal and rhythm regular [Edema] : edema was not present [Bowel Sounds] : normal bowel sounds [Abdomen Tenderness] : non-tender [Abdomen Soft] : soft [Normal Bladder] : the bladder was normal on palpation [No Spinal Tenderness] : no spinal tenderness [Normal Color / Pigmentation] : normal skin color and pigmentation [Normal Turgor] : normal skin turgor [No Focal Deficits] : no focal deficits [Normal Affect] : the affect was normal [Normal Mood] : the mood was normal [de-identified] : no tenderness on palpation

## 2021-11-30 NOTE — HISTORY OF PRESENT ILLNESS
[FreeTextEntry1] : 89 y/o female. PMH of dementia, CKD, , DM (not on medications), Hypothyroidism, anemia of CKD comes for concern of UTI. \par \par # UTI \par HHA noticed on 2 occasions slighly pinkish urine \par Daughter and HHA present on visit- no note of change of mental status from baseline. \par No s/o urgency, pain, increased frequence, odor\par urine is clear as per picture provided \par denies fever, chills, recent trauma\par hydrates well\par

## 2021-12-07 DIAGNOSIS — N20.0 CALCULUS OF KIDNEY: ICD-10-CM

## 2021-12-07 LAB
APPEARANCE: CLEAR
BILIRUBIN URINE: NEGATIVE
BLOOD URINE: NEGATIVE
COLOR: NORMAL
GLUCOSE QUALITATIVE U: NEGATIVE
KETONES URINE: NEGATIVE
LEUKOCYTE ESTERASE URINE: NEGATIVE
NITRITE URINE: NEGATIVE
PH URINE: 8.5
PROTEIN URINE: ABNORMAL
SPECIFIC GRAVITY URINE: 1.01
UROBILINOGEN URINE: NORMAL

## 2021-12-23 NOTE — ED PROVIDER NOTE - PHYSICAL EXAMINATION
Shanell Rodrigues, DO:   Gen: Well appearing, NAD  Head: NCAT  HEENT: PERRL, MMM, normal conjunctiva, anicteric, neck supple  Lung: CTAB, no adventitious sounds  CV: RRR, no chest wall TTP  Abd: soft, NTND, no rebound or guarding  MSK: No edema, no visible deformities  Neuro: Ambulatory with stable gait.   Skin: Warm and dry, no evidence of rash  Psych: normal mood and affect
No

## 2022-01-01 ENCOUNTER — TRANSCRIPTION ENCOUNTER (OUTPATIENT)
Age: 87
End: 2022-01-01

## 2022-01-01 ENCOUNTER — INPATIENT (INPATIENT)
Facility: HOSPITAL | Age: 87
LOS: 3 days | Discharge: HOME HEALTH | DRG: 987 | End: 2022-07-01
Attending: INTERNAL MEDICINE | Admitting: INTERNAL MEDICINE
Payer: COMMERCIAL

## 2022-01-01 ENCOUNTER — LABORATORY RESULT (OUTPATIENT)
Age: 87
End: 2022-01-01

## 2022-01-01 ENCOUNTER — NON-APPOINTMENT (OUTPATIENT)
Age: 87
End: 2022-01-01

## 2022-01-01 ENCOUNTER — APPOINTMENT (OUTPATIENT)
Dept: GERIATRICS | Facility: CLINIC | Age: 87
End: 2022-01-01

## 2022-01-01 ENCOUNTER — RX RENEWAL (OUTPATIENT)
Age: 87
End: 2022-01-01

## 2022-01-01 VITALS
DIASTOLIC BLOOD PRESSURE: 56 MMHG | RESPIRATION RATE: 18 BRPM | SYSTOLIC BLOOD PRESSURE: 97 MMHG | HEART RATE: 83 BPM | OXYGEN SATURATION: 99 % | TEMPERATURE: 97 F

## 2022-01-01 VITALS
HEART RATE: 52 BPM | TEMPERATURE: 97.7 F | DIASTOLIC BLOOD PRESSURE: 52 MMHG | OXYGEN SATURATION: 97 % | WEIGHT: 108 LBS | BODY MASS INDEX: 20.41 KG/M2 | RESPIRATION RATE: 15 BRPM | SYSTOLIC BLOOD PRESSURE: 110 MMHG

## 2022-01-01 VITALS
DIASTOLIC BLOOD PRESSURE: 76 MMHG | SYSTOLIC BLOOD PRESSURE: 115 MMHG | WEIGHT: 119.93 LBS | HEART RATE: 78 BPM | HEIGHT: 63 IN | OXYGEN SATURATION: 97 % | RESPIRATION RATE: 18 BRPM

## 2022-01-01 VITALS
HEART RATE: 86 BPM | RESPIRATION RATE: 15 BRPM | BODY MASS INDEX: 18.52 KG/M2 | OXYGEN SATURATION: 96 % | TEMPERATURE: 98.3 F | DIASTOLIC BLOOD PRESSURE: 68 MMHG | SYSTOLIC BLOOD PRESSURE: 118 MMHG | WEIGHT: 98 LBS

## 2022-01-01 DIAGNOSIS — N39.0 URINARY TRACT INFECTION, SITE NOT SPECIFIED: ICD-10-CM

## 2022-01-01 DIAGNOSIS — N18.30 CHRONIC KIDNEY DISEASE, STAGE 3 UNSPECIFIED: ICD-10-CM

## 2022-01-01 DIAGNOSIS — D64.9 ANEMIA, UNSPECIFIED: ICD-10-CM

## 2022-01-01 DIAGNOSIS — R10.9 UNSPECIFIED ABDOMINAL PAIN: ICD-10-CM

## 2022-01-01 DIAGNOSIS — I10 ESSENTIAL (PRIMARY) HYPERTENSION: ICD-10-CM

## 2022-01-01 DIAGNOSIS — R26.81 UNSTEADINESS ON FEET: ICD-10-CM

## 2022-01-01 DIAGNOSIS — F03.90 UNSPECIFIED DEMENTIA W/OUT BEHAVIORAL DISTURBANCE: ICD-10-CM

## 2022-01-01 DIAGNOSIS — E11.9 TYPE 2 DIABETES MELLITUS W/OUT COMPLICATIONS: ICD-10-CM

## 2022-01-01 DIAGNOSIS — I82.409 ACUTE EMBOLISM AND THROMBOSIS OF UNSPECIFIED DEEP VEINS OF UNSPECIFIED LOWER EXTREMITY: ICD-10-CM

## 2022-01-01 DIAGNOSIS — E03.9 HYPOTHYROIDISM, UNSPECIFIED: ICD-10-CM

## 2022-01-01 DIAGNOSIS — Z23 ENCOUNTER FOR IMMUNIZATION: ICD-10-CM

## 2022-01-01 DIAGNOSIS — E46 UNSPECIFIED PROTEIN-CALORIE MALNUTRITION: ICD-10-CM

## 2022-01-01 DIAGNOSIS — Z71.89 OTHER SPECIFIED COUNSELING: ICD-10-CM

## 2022-01-01 LAB
-  AMIKACIN: SIGNIFICANT CHANGE UP
-  AMOXICILLIN/CLAVULANIC ACID: SIGNIFICANT CHANGE UP
-  AMPICILLIN/SULBACTAM: SIGNIFICANT CHANGE UP
-  AMPICILLIN: SIGNIFICANT CHANGE UP
-  AZTREONAM: SIGNIFICANT CHANGE UP
-  CEFAZOLIN: SIGNIFICANT CHANGE UP
-  CEFEPIME: SIGNIFICANT CHANGE UP
-  CEFTRIAXONE: SIGNIFICANT CHANGE UP
-  CIPROFLOXACIN: SIGNIFICANT CHANGE UP
-  ERTAPENEM: SIGNIFICANT CHANGE UP
-  GENTAMICIN: SIGNIFICANT CHANGE UP
-  IMIPENEM: SIGNIFICANT CHANGE UP
-  LEVOFLOXACIN: SIGNIFICANT CHANGE UP
-  MEROPENEM: SIGNIFICANT CHANGE UP
-  NITROFURANTOIN: SIGNIFICANT CHANGE UP
-  PIPERACILLIN/TAZOBACTAM: SIGNIFICANT CHANGE UP
-  TIGECYCLINE: SIGNIFICANT CHANGE UP
-  TOBRAMYCIN: SIGNIFICANT CHANGE UP
-  TRIMETHOPRIM/SULFAMETHOXAZOLE: SIGNIFICANT CHANGE UP
ALBUMIN SERPL ELPH-MCNC: 3.9 G/DL — SIGNIFICANT CHANGE UP (ref 3.3–5)
ALP SERPL-CCNC: 74 U/L — SIGNIFICANT CHANGE UP (ref 40–120)
ALT FLD-CCNC: 8 U/L — LOW (ref 10–45)
ANION GAP SERPL CALC-SCNC: 11 MMOL/L — SIGNIFICANT CHANGE UP (ref 5–17)
ANION GAP SERPL CALC-SCNC: 13 MMOL/L — SIGNIFICANT CHANGE UP (ref 5–17)
ANION GAP SERPL CALC-SCNC: 13 MMOL/L — SIGNIFICANT CHANGE UP (ref 5–17)
ANION GAP SERPL CALC-SCNC: 8 MMOL/L — SIGNIFICANT CHANGE UP (ref 5–17)
APPEARANCE UR: ABNORMAL
APTT BLD: 31.3 SEC — SIGNIFICANT CHANGE UP (ref 27.5–35.5)
AST SERPL-CCNC: 19 U/L — SIGNIFICANT CHANGE UP (ref 10–40)
BACTERIA # UR AUTO: ABNORMAL
BASOPHILS # BLD AUTO: 0.13 K/UL — SIGNIFICANT CHANGE UP (ref 0–0.2)
BASOPHILS NFR BLD AUTO: 0.8 % — SIGNIFICANT CHANGE UP (ref 0–2)
BILIRUB SERPL-MCNC: 0.1 MG/DL — LOW (ref 0.2–1.2)
BILIRUB UR-MCNC: NEGATIVE — SIGNIFICANT CHANGE UP
BUN SERPL-MCNC: 19 MG/DL — SIGNIFICANT CHANGE UP (ref 7–23)
BUN SERPL-MCNC: 29 MG/DL — HIGH (ref 7–23)
BUN SERPL-MCNC: 37 MG/DL — HIGH (ref 7–23)
BUN SERPL-MCNC: 52 MG/DL — HIGH (ref 7–23)
CALCIUM SERPL-MCNC: 8.9 MG/DL — SIGNIFICANT CHANGE UP (ref 8.4–10.5)
CALCIUM SERPL-MCNC: 9 MG/DL — SIGNIFICANT CHANGE UP (ref 8.4–10.5)
CALCIUM SERPL-MCNC: 9.1 MG/DL — SIGNIFICANT CHANGE UP (ref 8.4–10.5)
CALCIUM SERPL-MCNC: 9.3 MG/DL — SIGNIFICANT CHANGE UP (ref 8.4–10.5)
CHLORIDE SERPL-SCNC: 106 MMOL/L — SIGNIFICANT CHANGE UP (ref 96–108)
CHLORIDE SERPL-SCNC: 107 MMOL/L — SIGNIFICANT CHANGE UP (ref 96–108)
CHLORIDE SERPL-SCNC: 112 MMOL/L — HIGH (ref 96–108)
CHLORIDE SERPL-SCNC: 114 MMOL/L — HIGH (ref 96–108)
CO2 SERPL-SCNC: 20 MMOL/L — LOW (ref 22–31)
CO2 SERPL-SCNC: 21 MMOL/L — LOW (ref 22–31)
CO2 SERPL-SCNC: 22 MMOL/L — SIGNIFICANT CHANGE UP (ref 22–31)
CO2 SERPL-SCNC: 25 MMOL/L — SIGNIFICANT CHANGE UP (ref 22–31)
COLOR SPEC: SIGNIFICANT CHANGE UP
CREAT SERPL-MCNC: 1.25 MG/DL — SIGNIFICANT CHANGE UP (ref 0.5–1.3)
CREAT SERPL-MCNC: 1.48 MG/DL — HIGH (ref 0.5–1.3)
CREAT SERPL-MCNC: 1.62 MG/DL — HIGH (ref 0.5–1.3)
CREAT SERPL-MCNC: 2.09 MG/DL — HIGH (ref 0.5–1.3)
CULTURE RESULTS: SIGNIFICANT CHANGE UP
DIFF PNL FLD: ABNORMAL
EGFR: 22 ML/MIN/1.73M2 — LOW
EGFR: 29 ML/MIN/1.73M2 — LOW
EGFR: 33 ML/MIN/1.73M2 — LOW
EGFR: 40 ML/MIN/1.73M2 — LOW
EOSINOPHIL # BLD AUTO: 0.16 K/UL — SIGNIFICANT CHANGE UP (ref 0–0.5)
EOSINOPHIL NFR BLD AUTO: 1 % — SIGNIFICANT CHANGE UP (ref 0–6)
EPI CELLS # UR: 1 /HPF — SIGNIFICANT CHANGE UP
FLUAV AG NPH QL: SIGNIFICANT CHANGE UP
FLUBV AG NPH QL: SIGNIFICANT CHANGE UP
GLUCOSE BLDC GLUCOMTR-MCNC: 105 MG/DL — HIGH (ref 70–99)
GLUCOSE BLDC GLUCOMTR-MCNC: 90 MG/DL — SIGNIFICANT CHANGE UP (ref 70–99)
GLUCOSE SERPL-MCNC: 106 MG/DL — HIGH (ref 70–99)
GLUCOSE SERPL-MCNC: 107 MG/DL — HIGH (ref 70–99)
GLUCOSE SERPL-MCNC: 116 MG/DL — HIGH (ref 70–99)
GLUCOSE SERPL-MCNC: 129 MG/DL — HIGH (ref 70–99)
GLUCOSE UR QL: NEGATIVE — SIGNIFICANT CHANGE UP
HCT VFR BLD CALC: 26.7 % — LOW (ref 34.5–45)
HCT VFR BLD CALC: 27.2 % — LOW (ref 34.5–45)
HCT VFR BLD CALC: 29.4 % — LOW (ref 34.5–45)
HCT VFR BLD CALC: 30.8 % — LOW (ref 34.5–45)
HGB BLD-MCNC: 8.4 G/DL — LOW (ref 11.5–15.5)
HGB BLD-MCNC: 8.6 G/DL — LOW (ref 11.5–15.5)
HGB BLD-MCNC: 9.2 G/DL — LOW (ref 11.5–15.5)
HGB BLD-MCNC: 9.5 G/DL — LOW (ref 11.5–15.5)
HYALINE CASTS # UR AUTO: 0 /LPF — SIGNIFICANT CHANGE UP (ref 0–2)
IMM GRANULOCYTES NFR BLD AUTO: 0.6 % — SIGNIFICANT CHANGE UP (ref 0–1.5)
INR BLD: 1.47 RATIO — HIGH (ref 0.88–1.16)
KETONES UR-MCNC: NEGATIVE — SIGNIFICANT CHANGE UP
LEUKOCYTE ESTERASE UR-ACNC: ABNORMAL
LYMPHOCYTES # BLD AUTO: 1.69 K/UL — SIGNIFICANT CHANGE UP (ref 1–3.3)
LYMPHOCYTES # BLD AUTO: 10.3 % — LOW (ref 13–44)
MAGNESIUM SERPL-MCNC: 2.1 MG/DL — SIGNIFICANT CHANGE UP (ref 1.6–2.6)
MCHC RBC-ENTMCNC: 29.9 GM/DL — LOW (ref 32–36)
MCHC RBC-ENTMCNC: 29.9 PG — SIGNIFICANT CHANGE UP (ref 27–34)
MCHC RBC-ENTMCNC: 30.2 PG — SIGNIFICANT CHANGE UP (ref 27–34)
MCHC RBC-ENTMCNC: 30.4 PG — SIGNIFICANT CHANGE UP (ref 27–34)
MCHC RBC-ENTMCNC: 30.5 PG — SIGNIFICANT CHANGE UP (ref 27–34)
MCHC RBC-ENTMCNC: 30.9 GM/DL — LOW (ref 32–36)
MCHC RBC-ENTMCNC: 32.2 GM/DL — SIGNIFICANT CHANGE UP (ref 32–36)
MCHC RBC-ENTMCNC: 32.3 GM/DL — SIGNIFICANT CHANGE UP (ref 32–36)
MCV RBC AUTO: 101 FL — HIGH (ref 80–100)
MCV RBC AUTO: 93.9 FL — SIGNIFICANT CHANGE UP (ref 80–100)
MCV RBC AUTO: 94.7 FL — SIGNIFICANT CHANGE UP (ref 80–100)
MCV RBC AUTO: 96.8 FL — SIGNIFICANT CHANGE UP (ref 80–100)
METHOD TYPE: SIGNIFICANT CHANGE UP
MONOCYTES # BLD AUTO: 0.97 K/UL — HIGH (ref 0–0.9)
MONOCYTES NFR BLD AUTO: 5.9 % — SIGNIFICANT CHANGE UP (ref 2–14)
NEUTROPHILS # BLD AUTO: 13.29 K/UL — HIGH (ref 1.8–7.4)
NEUTROPHILS NFR BLD AUTO: 81.4 % — HIGH (ref 43–77)
NITRITE UR-MCNC: POSITIVE
NRBC # BLD: 0 /100 WBCS — SIGNIFICANT CHANGE UP (ref 0–0)
ORGANISM # SPEC MICROSCOPIC CNT: SIGNIFICANT CHANGE UP
ORGANISM # SPEC MICROSCOPIC CNT: SIGNIFICANT CHANGE UP
PH UR: 6 — SIGNIFICANT CHANGE UP (ref 5–8)
PLATELET # BLD AUTO: 265 K/UL — SIGNIFICANT CHANGE UP (ref 150–400)
PLATELET # BLD AUTO: 270 K/UL — SIGNIFICANT CHANGE UP (ref 150–400)
PLATELET # BLD AUTO: 274 K/UL — SIGNIFICANT CHANGE UP (ref 150–400)
PLATELET # BLD AUTO: 294 K/UL — SIGNIFICANT CHANGE UP (ref 150–400)
POTASSIUM SERPL-MCNC: 4 MMOL/L — SIGNIFICANT CHANGE UP (ref 3.5–5.3)
POTASSIUM SERPL-MCNC: 4.1 MMOL/L — SIGNIFICANT CHANGE UP (ref 3.5–5.3)
POTASSIUM SERPL-MCNC: 4.4 MMOL/L — SIGNIFICANT CHANGE UP (ref 3.5–5.3)
POTASSIUM SERPL-MCNC: 4.4 MMOL/L — SIGNIFICANT CHANGE UP (ref 3.5–5.3)
POTASSIUM SERPL-SCNC: 4 MMOL/L — SIGNIFICANT CHANGE UP (ref 3.5–5.3)
POTASSIUM SERPL-SCNC: 4.1 MMOL/L — SIGNIFICANT CHANGE UP (ref 3.5–5.3)
POTASSIUM SERPL-SCNC: 4.4 MMOL/L — SIGNIFICANT CHANGE UP (ref 3.5–5.3)
POTASSIUM SERPL-SCNC: 4.4 MMOL/L — SIGNIFICANT CHANGE UP (ref 3.5–5.3)
PROT SERPL-MCNC: 7.4 G/DL — SIGNIFICANT CHANGE UP (ref 6–8.3)
PROT UR-MCNC: ABNORMAL
PROTHROM AB SERPL-ACNC: 17 SEC — HIGH (ref 10.5–13.4)
RBC # BLD: 2.81 M/UL — LOW (ref 3.8–5.2)
RBC # BLD: 2.82 M/UL — LOW (ref 3.8–5.2)
RBC # BLD: 3.05 M/UL — LOW (ref 3.8–5.2)
RBC # BLD: 3.13 M/UL — LOW (ref 3.8–5.2)
RBC # FLD: 12.9 % — SIGNIFICANT CHANGE UP (ref 10.3–14.5)
RBC # FLD: 13 % — SIGNIFICANT CHANGE UP (ref 10.3–14.5)
RBC # FLD: 13.1 % — SIGNIFICANT CHANGE UP (ref 10.3–14.5)
RBC # FLD: 13.3 % — SIGNIFICANT CHANGE UP (ref 10.3–14.5)
RBC CASTS # UR COMP ASSIST: 2 /HPF — SIGNIFICANT CHANGE UP (ref 0–4)
RSV RNA NPH QL NAA+NON-PROBE: SIGNIFICANT CHANGE UP
SARS-COV-2 RNA SPEC QL NAA+PROBE: SIGNIFICANT CHANGE UP
SODIUM SERPL-SCNC: 139 MMOL/L — SIGNIFICANT CHANGE UP (ref 135–145)
SODIUM SERPL-SCNC: 141 MMOL/L — SIGNIFICANT CHANGE UP (ref 135–145)
SODIUM SERPL-SCNC: 145 MMOL/L — SIGNIFICANT CHANGE UP (ref 135–145)
SODIUM SERPL-SCNC: 147 MMOL/L — HIGH (ref 135–145)
SP GR SPEC: 1.01 — SIGNIFICANT CHANGE UP (ref 1.01–1.02)
SPECIMEN SOURCE: SIGNIFICANT CHANGE UP
TROPONIN T, HIGH SENSITIVITY RESULT: 102 NG/L — HIGH (ref 0–51)
UROBILINOGEN FLD QL: NEGATIVE — SIGNIFICANT CHANGE UP
WBC # BLD: 11.49 K/UL — HIGH (ref 3.8–10.5)
WBC # BLD: 11.78 K/UL — HIGH (ref 3.8–10.5)
WBC # BLD: 15.09 K/UL — HIGH (ref 3.8–10.5)
WBC # BLD: 16.34 K/UL — HIGH (ref 3.8–10.5)
WBC # FLD AUTO: 11.49 K/UL — HIGH (ref 3.8–10.5)
WBC # FLD AUTO: 11.78 K/UL — HIGH (ref 3.8–10.5)
WBC # FLD AUTO: 15.09 K/UL — HIGH (ref 3.8–10.5)
WBC # FLD AUTO: 16.34 K/UL — HIGH (ref 3.8–10.5)
WBC UR QL: 48 /HPF — HIGH (ref 0–5)

## 2022-01-01 PROCEDURE — 85730 THROMBOPLASTIN TIME PARTIAL: CPT

## 2022-01-01 PROCEDURE — 96375 TX/PRO/DX INJ NEW DRUG ADDON: CPT

## 2022-01-01 PROCEDURE — 70450 CT HEAD/BRAIN W/O DYE: CPT | Mod: 26,MA

## 2022-01-01 PROCEDURE — 87637 SARSCOV2&INF A&B&RSV AMP PRB: CPT

## 2022-01-01 PROCEDURE — 96374 THER/PROPH/DIAG INJ IV PUSH: CPT

## 2022-01-01 PROCEDURE — 71045 X-RAY EXAM CHEST 1 VIEW: CPT

## 2022-01-01 PROCEDURE — 80048 BASIC METABOLIC PNL TOTAL CA: CPT

## 2022-01-01 PROCEDURE — 70486 CT MAXILLOFACIAL W/O DYE: CPT | Mod: 26,MA

## 2022-01-01 PROCEDURE — 90471 IMMUNIZATION ADMIN: CPT

## 2022-01-01 PROCEDURE — 70450 CT HEAD/BRAIN W/O DYE: CPT | Mod: MA

## 2022-01-01 PROCEDURE — 99214 OFFICE O/P EST MOD 30 MIN: CPT

## 2022-01-01 PROCEDURE — 99285 EMERGENCY DEPT VISIT HI MDM: CPT

## 2022-01-01 PROCEDURE — 85025 COMPLETE CBC W/AUTO DIFF WBC: CPT

## 2022-01-01 PROCEDURE — 85610 PROTHROMBIN TIME: CPT

## 2022-01-01 PROCEDURE — 72125 CT NECK SPINE W/O DYE: CPT | Mod: MA

## 2022-01-01 PROCEDURE — 71045 X-RAY EXAM CHEST 1 VIEW: CPT | Mod: 26

## 2022-01-01 PROCEDURE — 97161 PT EVAL LOW COMPLEX 20 MIN: CPT

## 2022-01-01 PROCEDURE — 85027 COMPLETE CBC AUTOMATED: CPT

## 2022-01-01 PROCEDURE — 87077 CULTURE AEROBIC IDENTIFY: CPT

## 2022-01-01 PROCEDURE — 84484 ASSAY OF TROPONIN QUANT: CPT

## 2022-01-01 PROCEDURE — 36415 COLL VENOUS BLD VENIPUNCTURE: CPT

## 2022-01-01 PROCEDURE — 83735 ASSAY OF MAGNESIUM: CPT

## 2022-01-01 PROCEDURE — 82962 GLUCOSE BLOOD TEST: CPT

## 2022-01-01 PROCEDURE — 87086 URINE CULTURE/COLONY COUNT: CPT

## 2022-01-01 PROCEDURE — 90715 TDAP VACCINE 7 YRS/> IM: CPT

## 2022-01-01 PROCEDURE — 92610 EVALUATE SWALLOWING FUNCTION: CPT

## 2022-01-01 PROCEDURE — 99285 EMERGENCY DEPT VISIT HI MDM: CPT | Mod: 25

## 2022-01-01 PROCEDURE — 70486 CT MAXILLOFACIAL W/O DYE: CPT | Mod: MA

## 2022-01-01 PROCEDURE — 99214 OFFICE O/P EST MOD 30 MIN: CPT | Mod: GC

## 2022-01-01 PROCEDURE — 72170 X-RAY EXAM OF PELVIS: CPT

## 2022-01-01 PROCEDURE — 72170 X-RAY EXAM OF PELVIS: CPT | Mod: 26

## 2022-01-01 PROCEDURE — 87186 SC STD MICRODIL/AGAR DIL: CPT

## 2022-01-01 PROCEDURE — 81001 URINALYSIS AUTO W/SCOPE: CPT

## 2022-01-01 PROCEDURE — 80053 COMPREHEN METABOLIC PANEL: CPT

## 2022-01-01 PROCEDURE — 72125 CT NECK SPINE W/O DYE: CPT | Mod: 26,MA

## 2022-01-01 RX ORDER — METOPROLOL TARTRATE 50 MG
5 TABLET ORAL ONCE
Refills: 0 | Status: DISCONTINUED | OUTPATIENT
Start: 2022-01-01 | End: 2022-01-01

## 2022-01-01 RX ORDER — GABAPENTIN 400 MG/1
2 CAPSULE ORAL
Qty: 0 | Refills: 0 | DISCHARGE

## 2022-01-01 RX ORDER — LOSARTAN POTASSIUM 100 MG/1
75 TABLET, FILM COATED ORAL ONCE
Refills: 0 | Status: COMPLETED | OUTPATIENT
Start: 2022-01-01 | End: 2022-01-01

## 2022-01-01 RX ORDER — AMLODIPINE BESYLATE 2.5 MG/1
5 TABLET ORAL DAILY
Refills: 0 | Status: DISCONTINUED | OUTPATIENT
Start: 2022-01-01 | End: 2022-01-01

## 2022-01-01 RX ORDER — TETANUS TOXOID, REDUCED DIPHTHERIA TOXOID AND ACELLULAR PERTUSSIS VACCINE, ADSORBED 5; 2.5; 8; 8; 2.5 [IU]/.5ML; [IU]/.5ML; UG/.5ML; UG/.5ML; UG/.5ML
0.5 SUSPENSION INTRAMUSCULAR ONCE
Refills: 0 | Status: COMPLETED | OUTPATIENT
Start: 2022-01-01 | End: 2022-01-01

## 2022-01-01 RX ORDER — SODIUM CHLORIDE 9 MG/ML
500 INJECTION, SOLUTION INTRAVENOUS
Refills: 0 | Status: COMPLETED | OUTPATIENT
Start: 2022-01-01 | End: 2022-01-01

## 2022-01-01 RX ORDER — ERTAPENEM SODIUM 1 G/1
500 INJECTION, POWDER, LYOPHILIZED, FOR SOLUTION INTRAMUSCULAR; INTRAVENOUS ONCE
Refills: 0 | Status: COMPLETED | OUTPATIENT
Start: 2022-01-01 | End: 2022-01-01

## 2022-01-01 RX ORDER — LOSARTAN POTASSIUM 100 MG/1
25 TABLET, FILM COATED ORAL DAILY
Refills: 0 | Status: DISCONTINUED | OUTPATIENT
Start: 2022-01-01 | End: 2022-01-01

## 2022-01-01 RX ORDER — ACETAMINOPHEN 500 MG
650 TABLET ORAL ONCE
Refills: 0 | Status: COMPLETED | OUTPATIENT
Start: 2022-01-01 | End: 2022-01-01

## 2022-01-01 RX ORDER — LOSARTAN POTASSIUM 100 MG/1
50 TABLET, FILM COATED ORAL DAILY
Refills: 0 | Status: DISCONTINUED | OUTPATIENT
Start: 2022-01-01 | End: 2022-01-01

## 2022-01-01 RX ORDER — IRBESARTAN 75 MG/1
75 TABLET ORAL DAILY
Qty: 90 | Refills: 2 | Status: DISCONTINUED | COMMUNITY
Start: 2018-11-07 | End: 2022-01-01

## 2022-01-01 RX ORDER — ERTAPENEM SODIUM 1 G/1
500 INJECTION, POWDER, LYOPHILIZED, FOR SOLUTION INTRAMUSCULAR; INTRAVENOUS EVERY 24 HOURS
Refills: 0 | Status: COMPLETED | OUTPATIENT
Start: 2022-01-01 | End: 2022-01-01

## 2022-01-01 RX ORDER — NIFEDIPINE 30 MG/1
30 TABLET, FILM COATED, EXTENDED RELEASE ORAL DAILY
Qty: 90 | Refills: 1 | Status: DISCONTINUED | COMMUNITY
Start: 2018-11-07 | End: 2022-01-01

## 2022-01-01 RX ORDER — RIVAROXABAN 15 MG-20MG
10 KIT ORAL DAILY
Refills: 0 | Status: DISCONTINUED | OUTPATIENT
Start: 2022-01-01 | End: 2022-01-01

## 2022-01-01 RX ORDER — HYDRALAZINE HCL 50 MG
10 TABLET ORAL EVERY 8 HOURS
Refills: 0 | Status: DISCONTINUED | OUTPATIENT
Start: 2022-01-01 | End: 2022-01-01

## 2022-01-01 RX ORDER — SODIUM CHLORIDE 9 MG/ML
1000 INJECTION, SOLUTION INTRAVENOUS
Refills: 0 | Status: DISCONTINUED | OUTPATIENT
Start: 2022-01-01 | End: 2022-01-01

## 2022-01-01 RX ORDER — NIFEDIPINE 60 MG/1
60 TABLET, EXTENDED RELEASE ORAL
Qty: 30 | Refills: 0 | Status: DISCONTINUED | COMMUNITY
Start: 2021-11-16

## 2022-01-01 RX ORDER — DONEPEZIL HYDROCHLORIDE 10 MG/1
1 TABLET, FILM COATED ORAL
Qty: 0 | Refills: 0 | DISCHARGE

## 2022-01-01 RX ORDER — VALPROIC ACID (AS SODIUM SALT) 250 MG/5ML
125 SOLUTION, ORAL ORAL EVERY 8 HOURS
Refills: 0 | Status: DISCONTINUED | OUTPATIENT
Start: 2022-01-01 | End: 2022-01-01

## 2022-01-01 RX ORDER — TRAZODONE HCL 50 MG
1 TABLET ORAL
Qty: 0 | Refills: 0 | DISCHARGE

## 2022-01-01 RX ORDER — NIFEDIPINE 30 MG
1 TABLET, EXTENDED RELEASE 24 HR ORAL
Qty: 0 | Refills: 0 | DISCHARGE

## 2022-01-01 RX ORDER — ERTAPENEM SODIUM 1 G/1
INJECTION, POWDER, LYOPHILIZED, FOR SOLUTION INTRAMUSCULAR; INTRAVENOUS
Refills: 0 | Status: COMPLETED | OUTPATIENT
Start: 2022-01-01 | End: 2022-01-01

## 2022-01-01 RX ORDER — TRAZODONE HCL 50 MG
100 TABLET ORAL AT BEDTIME
Refills: 0 | Status: DISCONTINUED | OUTPATIENT
Start: 2022-01-01 | End: 2022-01-01

## 2022-01-01 RX ORDER — LANOLIN ALCOHOL/MO/W.PET/CERES
3 CREAM (GRAM) TOPICAL ONCE
Refills: 0 | Status: COMPLETED | OUTPATIENT
Start: 2022-01-01 | End: 2022-01-01

## 2022-01-01 RX ORDER — NIFEDIPINE 30 MG
60 TABLET, EXTENDED RELEASE 24 HR ORAL DAILY
Refills: 0 | Status: DISCONTINUED | OUTPATIENT
Start: 2022-01-01 | End: 2022-01-01

## 2022-01-01 RX ORDER — CIPROFLOXACIN LACTATE 400MG/40ML
400 VIAL (ML) INTRAVENOUS ONCE
Refills: 0 | Status: COMPLETED | OUTPATIENT
Start: 2022-01-01 | End: 2022-01-01

## 2022-01-01 RX ORDER — LEVOTHYROXINE SODIUM 0.11 MG/1
112 TABLET ORAL DAILY
Qty: 90 | Refills: 3 | Status: ACTIVE | COMMUNITY
Start: 2018-11-17 | End: 1900-01-01

## 2022-01-01 RX ORDER — NIFEDIPINE 30 MG
60 TABLET, EXTENDED RELEASE 24 HR ORAL ONCE
Refills: 0 | Status: COMPLETED | OUTPATIENT
Start: 2022-01-01 | End: 2022-01-01

## 2022-01-01 RX ORDER — MEMANTINE HYDROCHLORIDE 10 MG/1
1 TABLET ORAL
Qty: 0 | Refills: 0 | DISCHARGE
Start: 2022-01-01

## 2022-01-01 RX ORDER — LACTOBACILLUS ACIDOPHILUS 100MM CELL
1 CAPSULE ORAL
Qty: 0 | Refills: 0 | DISCHARGE

## 2022-01-01 RX ORDER — VALPROIC ACID (AS SODIUM SALT) 250 MG/5ML
125 SOLUTION, ORAL ORAL ONCE
Refills: 0 | Status: COMPLETED | OUTPATIENT
Start: 2022-01-01 | End: 2022-01-01

## 2022-01-01 RX ORDER — LIDOCAINE HYDROCHLORIDE AND EPINEPHRINE 10; 10 MG/ML; UG/ML
10 INJECTION, SOLUTION INFILTRATION; PERINEURAL ONCE
Refills: 0 | Status: COMPLETED | OUTPATIENT
Start: 2022-01-01 | End: 2022-01-01

## 2022-01-01 RX ORDER — THIAMINE MONONITRATE (VIT B1) 100 MG
100 TABLET ORAL ONCE
Refills: 0 | Status: COMPLETED | OUTPATIENT
Start: 2022-01-01 | End: 2022-01-01

## 2022-01-01 RX ORDER — TRAZODONE HYDROCHLORIDE 100 MG/1
100 TABLET ORAL
Qty: 90 | Refills: 2 | Status: ACTIVE | COMMUNITY
Start: 2020-11-06

## 2022-01-01 RX ORDER — SODIUM CHLORIDE 9 MG/ML
250 INJECTION INTRAMUSCULAR; INTRAVENOUS; SUBCUTANEOUS ONCE
Refills: 0 | Status: COMPLETED | OUTPATIENT
Start: 2022-01-01 | End: 2022-01-01

## 2022-01-01 RX ORDER — MEMANTINE HYDROCHLORIDE 10 MG/1
5 TABLET ORAL
Refills: 0 | Status: DISCONTINUED | OUTPATIENT
Start: 2022-01-01 | End: 2022-01-01

## 2022-01-01 RX ORDER — DIVALPROEX SODIUM 500 MG/1
125 TABLET, DELAYED RELEASE ORAL AT BEDTIME
Refills: 0 | Status: DISCONTINUED | OUTPATIENT
Start: 2022-01-01 | End: 2022-01-01

## 2022-01-01 RX ORDER — IRBESARTAN 75 MG/1
1 TABLET ORAL
Qty: 0 | Refills: 0 | DISCHARGE

## 2022-01-01 RX ORDER — LEVOTHYROXINE SODIUM 125 MCG
1 TABLET ORAL
Qty: 0 | Refills: 0 | DISCHARGE

## 2022-01-01 RX ORDER — DONEPEZIL HYDROCHLORIDE 10 MG/1
10 TABLET ORAL
Qty: 90 | Refills: 2 | Status: DISCONTINUED | COMMUNITY
Start: 2019-01-25 | End: 2022-01-01

## 2022-01-01 RX ORDER — ACETAMINOPHEN 500 MG
1000 TABLET ORAL ONCE
Refills: 0 | Status: COMPLETED | OUTPATIENT
Start: 2022-01-01 | End: 2022-01-01

## 2022-01-01 RX ORDER — LORAZEPAM 0.5 MG/1
0.5 TABLET ORAL
Qty: 90 | Refills: 0 | Status: ACTIVE | COMMUNITY
Start: 2021-03-25 | End: 1900-01-01

## 2022-01-01 RX ORDER — MEMANTINE HYDROCHLORIDE 10 MG/1
10 TABLET, FILM COATED ORAL
Qty: 180 | Refills: 2 | Status: DISCONTINUED | COMMUNITY
Start: 2019-07-29 | End: 2022-01-01

## 2022-01-01 RX ORDER — DIAZEPAM 5 MG
5 TABLET ORAL ONCE
Refills: 0 | Status: DISCONTINUED | OUTPATIENT
Start: 2022-01-01 | End: 2022-01-01

## 2022-01-01 RX ADMIN — SODIUM CHLORIDE 75 MILLILITER(S): 9 INJECTION, SOLUTION INTRAVENOUS at 11:57

## 2022-01-01 RX ADMIN — Medication 1.25 MILLIGRAM(S): at 01:16

## 2022-01-01 RX ADMIN — Medication 51.25 MILLIGRAM(S): at 22:17

## 2022-01-01 RX ADMIN — Medication 650 MILLIGRAM(S): at 12:11

## 2022-01-01 RX ADMIN — Medication 100 MILLIGRAM(S): at 17:34

## 2022-01-01 RX ADMIN — Medication 1.25 MILLIGRAM(S): at 06:11

## 2022-01-01 RX ADMIN — SODIUM CHLORIDE 75 MILLILITER(S): 9 INJECTION, SOLUTION INTRAVENOUS at 22:02

## 2022-01-01 RX ADMIN — LOSARTAN POTASSIUM 25 MILLIGRAM(S): 100 TABLET, FILM COATED ORAL at 05:41

## 2022-01-01 RX ADMIN — Medication 10 MILLIGRAM(S): at 17:31

## 2022-01-01 RX ADMIN — Medication 100 MILLIGRAM(S): at 21:44

## 2022-01-01 RX ADMIN — DIVALPROEX SODIUM 125 MILLIGRAM(S): 500 TABLET, DELAYED RELEASE ORAL at 23:06

## 2022-01-01 RX ADMIN — ERTAPENEM SODIUM 100 MILLIGRAM(S): 1 INJECTION, POWDER, LYOPHILIZED, FOR SOLUTION INTRAMUSCULAR; INTRAVENOUS at 17:34

## 2022-01-01 RX ADMIN — Medication 1 TABLET(S): at 11:56

## 2022-01-01 RX ADMIN — Medication 1 TABLET(S): at 11:20

## 2022-01-01 RX ADMIN — MEMANTINE HYDROCHLORIDE 5 MILLIGRAM(S): 10 TABLET ORAL at 05:41

## 2022-01-01 RX ADMIN — SODIUM CHLORIDE 75 MILLILITER(S): 9 INJECTION, SOLUTION INTRAVENOUS at 14:01

## 2022-01-01 RX ADMIN — AMLODIPINE BESYLATE 5 MILLIGRAM(S): 2.5 TABLET ORAL at 11:20

## 2022-01-01 RX ADMIN — Medication 0.5 MILLIGRAM(S): at 17:44

## 2022-01-01 RX ADMIN — Medication 10 MILLIGRAM(S): at 17:01

## 2022-01-01 RX ADMIN — Medication 100 MILLIGRAM(S): at 22:13

## 2022-01-01 RX ADMIN — MEMANTINE HYDROCHLORIDE 5 MILLIGRAM(S): 10 TABLET ORAL at 05:29

## 2022-01-01 RX ADMIN — Medication 0.5 MILLIGRAM(S): at 06:08

## 2022-01-01 RX ADMIN — ERTAPENEM SODIUM 100 MILLIGRAM(S): 1 INJECTION, POWDER, LYOPHILIZED, FOR SOLUTION INTRAMUSCULAR; INTRAVENOUS at 17:42

## 2022-01-01 RX ADMIN — Medication 1.25 MILLIGRAM(S): at 11:05

## 2022-01-01 RX ADMIN — RIVAROXABAN 10 MILLIGRAM(S): KIT at 12:19

## 2022-01-01 RX ADMIN — Medication 1.25 MILLIGRAM(S): at 18:12

## 2022-01-01 RX ADMIN — RIVAROXABAN 10 MILLIGRAM(S): KIT at 11:05

## 2022-01-01 RX ADMIN — Medication 3 MILLIGRAM(S): at 23:04

## 2022-01-01 RX ADMIN — Medication 1.25 MILLIGRAM(S): at 17:43

## 2022-01-01 RX ADMIN — SODIUM CHLORIDE 75 MILLILITER(S): 9 INJECTION, SOLUTION INTRAVENOUS at 17:34

## 2022-01-01 RX ADMIN — Medication 60 MILLIGRAM(S): at 05:41

## 2022-01-01 RX ADMIN — Medication 1 TABLET(S): at 12:19

## 2022-01-01 RX ADMIN — Medication 1.25 MILLIGRAM(S): at 12:18

## 2022-01-01 RX ADMIN — Medication 1 TABLET(S): at 11:05

## 2022-01-01 RX ADMIN — Medication 0.5 MILLIGRAM(S): at 10:16

## 2022-01-01 RX ADMIN — AMLODIPINE BESYLATE 5 MILLIGRAM(S): 2.5 TABLET ORAL at 22:13

## 2022-01-01 RX ADMIN — Medication 0.5 MILLIGRAM(S): at 06:12

## 2022-01-01 RX ADMIN — Medication 1.25 MILLIGRAM(S): at 23:00

## 2022-01-01 RX ADMIN — SODIUM CHLORIDE 1000 MILLILITER(S): 9 INJECTION INTRAMUSCULAR; INTRAVENOUS; SUBCUTANEOUS at 10:30

## 2022-01-01 RX ADMIN — Medication 10 MILLIGRAM(S): at 22:17

## 2022-01-01 RX ADMIN — TETANUS TOXOID, REDUCED DIPHTHERIA TOXOID AND ACELLULAR PERTUSSIS VACCINE, ADSORBED 0.5 MILLILITER(S): 5; 2.5; 8; 8; 2.5 SUSPENSION INTRAMUSCULAR at 05:11

## 2022-01-01 RX ADMIN — MEMANTINE HYDROCHLORIDE 5 MILLIGRAM(S): 10 TABLET ORAL at 17:36

## 2022-01-01 RX ADMIN — DIVALPROEX SODIUM 125 MILLIGRAM(S): 500 TABLET, DELAYED RELEASE ORAL at 22:59

## 2022-01-01 RX ADMIN — MEMANTINE HYDROCHLORIDE 5 MILLIGRAM(S): 10 TABLET ORAL at 17:17

## 2022-01-01 RX ADMIN — SODIUM CHLORIDE 75 MILLILITER(S): 9 INJECTION, SOLUTION INTRAVENOUS at 20:39

## 2022-01-01 RX ADMIN — Medication 400 MILLIGRAM(S): at 04:12

## 2022-01-01 RX ADMIN — LOSARTAN POTASSIUM 50 MILLIGRAM(S): 100 TABLET, FILM COATED ORAL at 05:43

## 2022-01-01 RX ADMIN — Medication 0.5 MILLIGRAM(S): at 18:34

## 2022-01-01 RX ADMIN — LIDOCAINE HYDROCHLORIDE AND EPINEPHRINE 10 MILLILITER(S): 10; 10 INJECTION, SOLUTION INFILTRATION; PERINEURAL at 06:01

## 2022-01-01 RX ADMIN — SODIUM CHLORIDE 75 MILLILITER(S): 9 INJECTION, SOLUTION INTRAVENOUS at 06:29

## 2022-01-01 RX ADMIN — Medication 5 MILLIGRAM(S): at 06:51

## 2022-01-01 RX ADMIN — RIVAROXABAN 10 MILLIGRAM(S): KIT at 11:56

## 2022-01-01 RX ADMIN — Medication 650 MILLIGRAM(S): at 13:15

## 2022-01-01 RX ADMIN — MEMANTINE HYDROCHLORIDE 5 MILLIGRAM(S): 10 TABLET ORAL at 17:44

## 2022-01-01 RX ADMIN — ERTAPENEM SODIUM 100 MILLIGRAM(S): 1 INJECTION, POWDER, LYOPHILIZED, FOR SOLUTION INTRAMUSCULAR; INTRAVENOUS at 11:56

## 2022-01-01 RX ADMIN — MEMANTINE HYDROCHLORIDE 5 MILLIGRAM(S): 10 TABLET ORAL at 06:08

## 2022-01-01 RX ADMIN — RIVAROXABAN 10 MILLIGRAM(S): KIT at 11:20

## 2022-01-01 RX ADMIN — Medication 1.25 MILLIGRAM(S): at 17:35

## 2022-01-01 RX ADMIN — ERTAPENEM SODIUM 100 MILLIGRAM(S): 1 INJECTION, POWDER, LYOPHILIZED, FOR SOLUTION INTRAMUSCULAR; INTRAVENOUS at 17:16

## 2022-01-01 RX ADMIN — Medication 10 MILLIGRAM(S): at 02:58

## 2022-01-01 RX ADMIN — Medication 1.25 MILLIGRAM(S): at 06:08

## 2022-01-01 RX ADMIN — Medication 125 MILLIGRAM(S): at 13:00

## 2022-01-07 ENCOUNTER — APPOINTMENT (OUTPATIENT)
Dept: ULTRASOUND IMAGING | Facility: IMAGING CENTER | Age: 87
End: 2022-01-07

## 2022-01-14 DIAGNOSIS — F22 DELUSIONAL DISORDERS: ICD-10-CM

## 2022-02-28 ENCOUNTER — APPOINTMENT (OUTPATIENT)
Dept: GERIATRICS | Facility: CLINIC | Age: 87
End: 2022-02-28
Payer: MEDICARE

## 2022-02-28 VITALS
OXYGEN SATURATION: 99 % | RESPIRATION RATE: 15 BRPM | TEMPERATURE: 97.6 F | WEIGHT: 119 LBS | HEART RATE: 80 BPM | SYSTOLIC BLOOD PRESSURE: 108 MMHG | BODY MASS INDEX: 22.48 KG/M2 | DIASTOLIC BLOOD PRESSURE: 54 MMHG

## 2022-02-28 DIAGNOSIS — L98.9 DISORDER OF THE SKIN AND SUBCUTANEOUS TISSUE, UNSPECIFIED: ICD-10-CM

## 2022-02-28 PROCEDURE — 99214 OFFICE O/P EST MOD 30 MIN: CPT

## 2022-02-28 RX ORDER — GABAPENTIN 100 MG/1
100 CAPSULE ORAL
Qty: 180 | Refills: 2 | Status: DISCONTINUED | COMMUNITY
Start: 2021-02-26 | End: 2022-02-28

## 2022-02-28 NOTE — PHYSICAL EXAM
[de-identified] : right shin with scaly lesion without signs of infection, left forearm with raised red indurated with scab on top, without surround erythema  [General Appearance - Alert] : alert [General Appearance - In No Acute Distress] : in no acute distress [Sclera] : the sclera and conjunctiva were normal [Extraocular Movements] : extraocular movements were intact [No Oral Pallor] : no oral pallor [Both Tympanic Membranes Were Examined] : both tympanic membranes were normal [Neck Appearance] : the appearance of the neck was normal [] : no respiratory distress [Respiration, Rhythm And Depth] : normal respiratory rhythm and effort [Exaggerated Use Of Accessory Muscles For Inspiration] : no accessory muscle use [Heart Sounds] : normal S1 and S2 [Bowel Sounds] : normal bowel sounds [Abdomen Soft] : soft [Abdomen Tenderness] : non-tender [Cervical Lymph Nodes Enlarged Posterior Bilaterally] : posterior cervical [Cervical Lymph Nodes Enlarged Anterior Bilaterally] : anterior cervical [Supraclavicular Lymph Nodes Enlarged Bilaterally] : supraclavicular [Nail Clubbing] : no clubbing  or cyanosis of the fingernails [Involuntary Movements] : no involuntary movements were seen [Musculoskeletal - Swelling] : no joint swelling seen [FreeTextEntry1] : slow unsteady gait, no tendnerness to palpation of hips, knees or thigs. [No Focal Deficits] : no focal deficits [Affect] : the affect was normal [Mood] : the mood was normal

## 2022-02-28 NOTE — HISTORY OF PRESENT ILLNESS
[FreeTextEntry1] : 89 y/o female. PMH of dementia, CKD, , DM (not on medications), Hypothyroidism, anemia of CKD.\par \par Dementia with Behavioral disturbances:\par mainly with sundowning and sleeping disturbances with paranoia and anxiety. hx of increased daytime sleepiness with seroquel.\par sleeping disturbances remain with the trazadone\par \par skin lesion on arm, after scar healed\par skin lesion on right lower leg: chronic for months, hx of biopsy 1 year ago that benign, but now chronic nonhealing lesion\par \par hospitalized to [facility] on 8/27/21. Was referred to go ther by the podiatry clinic secondary to a wound on her toe, left 1st toe found with a wound and drainage, in addition it was found for her to have an acute DVT of the left external iliac, common femoral and femoral veins. Was initially started on heparin ggt and transitioned to Eliquis 10 mg po BID, but was reduced to 5 mg po BID due to comorbidities such as her CKD was reduced to 5 mg po BID for a total duration of 3-6 months. \par dr. Dr. Manjarrez vascular \par dvt: follow upcoming visit next month\par \par \par \par Care Team:\par Dtr Gin comes over daily\par has A 24/7 supervision \par \par ADL's/IADL's:\par Requires total assist needed aside from feeding.\par \par Continence:\par both UI and fecal incontinence.\par no skin breakdown\par \par pelvic prolapse: denies pain, bleeding or vaginal discharge\par \par Appetite:\par Good-- eating well, 3 meals a day with snacks\par \par functional Status:\par walks with walker. denies recent falls\par \par htn:more sleepy, hypotensive today\par \par \par hypothyroidism: adherent with medication\par \par

## 2022-06-27 NOTE — H&P ADULT - ASSESSMENT
Patient is a 89 year old female with PMH HTN, DM (not on meds), dementia, DVT on eliquis BIBEMS after an unwitnessed fall at home.  Ptn is altered, has  Acute cystits/UTI  L scalp laceration repaired. Tdap given  Unwitnessed fall at home  Leukocytosis likely d/t to above   H/o advanced dementia   H/o PCN and sulfa allergies - unknown reactions    - afebrile in ER, laceration repaired in ER, WBC noted    - UA with pyuria - 48 WBC, large LE, many bacteria and +nitrites   - imaging reviewed, has L scalp hematoma, no collections/abscess, no pna   - Neurology and ID following    - follow blood and urine cultures    - start on ertapenem 500mg IV Q24h - continue    - monitor temps/WBC   - place on Valproate prn agitation and at hs. Namenda 5 mg bid  - BP management w IV Hydralazine prn and standing Vasotec IV  - pr interval is prolonged  - thiamine iv and MVI  - IVF   - aspiration precautions   - dvt ppx w po Xarelto  - speech and swallow eval, for now place on dysphagia 1 diet

## 2022-06-27 NOTE — ED PROVIDER NOTE - PROGRESS NOTE DETAILS
Patient signed out to me by the prior attending.  The patient's disposition was discussed with the treating team and agreed upon.  Isaiah Gonzalez M.D. (attending) left temporal bleeding is now static, patient stable, pending ua and will admit.

## 2022-06-27 NOTE — ED ADULT NURSE NOTE - OBJECTIVE STATEMENT
Pt presents to the ED BIBA hx of dementia, co fall x approx 1am. As per EMS, home aide heard a thud and went to check on patient who was found on the ground with moderate head bleed. Upon assessment, pt is noted with L side head lac, wrapped in gauze. Pt noted to be on C-collar upon arrival, and lac wrapped in gauze by EMS. Pt is irritable and disoriented, and uncooperative. Pt on eliquis. Pt co L side HA.

## 2022-06-27 NOTE — CONSULT NOTE ADULT - ASSESSMENT
Patient is a 89 year old female with PMH HTN, DM (not on meds), dementia, DVT on eliquis BIBEMS after an unwitnessed fall at home.    Acute cystits/UTI  L scalp laceration  Unwitnessed fall at home  Leukocytosis likely d/t to above   H/o advanced dementia   H/o PCN and sulfa allergies - unknown reactions    - afebrile in ER, laceration repaired in ER, WBC noted    - UA with pyuria - 48 WBC, large LE, many bacteria and +nitrites   - imaging reviewed, has L scalp hematoma, no collections/abscess, no pna   - Neurology following    - follow blood and urine cultures    - started on ertapenem 500mg IV Q24h - continue    - monitor temps/WBC   - pain management per primary team   - aspiration precautions       D/w Dr. Patrice Elizabeth M.D.  [facility], Division of Infectious Diseases  [phone]  After 5pm on weekdays and all day on weekends - please call [phone]

## 2022-06-27 NOTE — ED ADULT NURSE REASSESSMENT NOTE - NS ED NURSE REASSESS COMMENT FT1
Patient was passed dysphagia screen but refused to take oral medication. Called admitting team, Sultana to make provider aware of patient's elevated blood pressure. Patient to receive IV antihypertensives as per EMAR. Plan of care discussed. Safety and comfort measures maintained.

## 2022-06-27 NOTE — H&P ADULT - HISTORY OF PRESENT ILLNESS
89 year old F with PMH HTN, DM (not on meds), dementia, DVT on eliquis BIBEMS after an unwitnessed fall at home. Aide heard her fall. Saw her on ground. Said she was lethargic immediately after. Patient arrived in C-collar. Laceration above R eyebrow re[paired in the ED. ptn is agitated and Cannot provide further history.

## 2022-06-27 NOTE — ED PROCEDURE NOTE - ATTENDING CONTRIBUTION TO CARE
wound dressed with Surgicel and kerlex with 4x4 for pressure  ***Isaiah Gonzalez MD FACEP*** Attending physician was available for the key components of the procedure, the patient tolerated well. There were no complications with the procedure.

## 2022-06-27 NOTE — H&P ADULT - NSHPPHYSICALEXAM_GEN_ALL_CORE
T(F): 97.9 (06-27-22 @ 17:55), Max: 97.9 (06-27-22 @ 17:55)  HR: 89 (06-27-22 @ 17:55) (72 - 96)  BP: 193/51 (06-27-22 @ 17:55) (115/76 - 209/61)  RR: 18 (06-27-22 @ 17:55) (17 - 20)  SpO2: 95% (06-27-22 @ 17:55) (95% - 99%)    PHYSICAL EXAM:  GENERAL: NAD, well-developed  HEAD:  Atraumatic, Normocephalic  EYES: EOMI, PERRLA, conjunctiva and sclera clear  NECK: Supple, No JVD  CHEST/LUNG: Clear to auscultation bilaterally; No wheeze  HEART: Regular rate and rhythm; No murmurs, rubs, or gallops  ABDOMEN: Soft, Nontender, Nondistended; Bowel sounds present  EXTREMITIES:  2+ Peripheral Pulses, No clubbing, cyanosis, or edema  PSYCH: AAOx3  NEUROLOGY: non-focal  SKIN: No rashes or lesions

## 2022-06-27 NOTE — ED PROVIDER NOTE - OBJECTIVE STATEMENT
89 year old F with PMH HTN, DM (not on meds), dementia, DVT on eliquis BIBEMS after an unwitnessed fall at home. Aide heard her fall. Saw her on ground. Said she was lethargic immediately after. Patient arrived in C-collar. Laceration above R eyebrow. Cannot provide further history.

## 2022-06-27 NOTE — ED PROVIDER NOTE - PHYSICAL EXAMINATION
GENERAL: Awake, alert, NAD  HEENT: Laceration above L eyebrow, bleeding well controlled, in bandage. moist mucous membranes, PERRL, EOMI. In c-collar.   LUNGS: CTAB, no wheezes or crackles   CARDIAC: RRR, no m/r/g  ABDOMEN: Soft, normal BS, non tender, non distended, no rebound, no guarding  BACK: No midline spinal tenderness, no CVA tenderness.   EXT: No edema, no calf tenderness, 2+ DP pulses bilaterally, no deformities. No pelvic instability.   NEURO: A&Ox1. Moving all extremities.  SKIN: Warm and dry. No rash.

## 2022-06-27 NOTE — ED PROVIDER NOTE - CLINICAL SUMMARY MEDICAL DECISION MAKING FREE TEXT BOX
89 year old F with PMH HTN, DM (not on meds), dementia, DVT on eliquis BIBEMS after an unwitnessed fall at home. VSS. GCS 15. AOx1. Moving all extremities, no pelvic instability. Patient in c-collar. Laceration above L eyebrow, will likely need repair. Tetanus. Basic labs. CTH/c-spine to r/o ICH. CXR. Reassess. 89 year old F with PMH HTN, DM (not on meds), dementia, DVT on eliquis BIBEMS after an unwitnessed fall at home. VSS. GCS 15. AOx1. Moving all extremities, no pelvic instability. Patient in c-collar. Laceration above L eyebrow, will likely need repair. Tetanus. Basic labs. CTH/c-spine to r/o ICH. CXR. Reassess.    RAMONE Mata, Attending: seen with resident and reviewed note.    Elderly demented female had fall at home. Aide knew immediately. + head trauma w/ lac. On DOAC. Limited hx from pt.     Primary intact. Vitals reassuring.    Forehead lac. Abd soft. Pelvis stable. Moving all extremities. B/l breath sounds. Distal pulses intact. No vert instability or stepoffs.    Needs head/neck imaging given age, fall and DOAC. Rest of exam unremarkable for trauma. Will need adacel and wound care/repair.   Screen for cardiac, metabolic, infectious reason for call. Suspect admission.

## 2022-06-27 NOTE — CONSULT NOTE ADULT - SUBJECTIVE AND OBJECTIVE BOX
DATE OF SERVICE: 06-27-22 @ 23:10    CHIEF COMPLAINT:Patient is a 89y old  Female who presents with a chief complaint of metabolic encephalopathy (27 Jun 2022 19:08)      HISTORY OF PRESENT ILLNESS:HPI:  89 year old F with PMH HTN, DM (not on meds), dementia, DVT on eliquis BIBEMS after an unwitnessed fall at home. Aide heard her fall. Saw her on ground. Said she was lethargic immediately after. Patient arrived in C-collar. Laceration above R eyebrow re[paired in the ED. ptn is agitated and Cannot provide further history. (27 Jun 2022 19:08)      PAST MEDICAL & SURGICAL HISTORY:  Diabetes mellitus      HTN (hypertension)      Dementia              MEDICATIONS:  enalaprilat Injectable 1.25 milliGRAM(s) IV Push every 6 hours  hydrALAZINE Injectable 10 milliGRAM(s) IV Push every 8 hours PRN    ertapenem  IVPB          diVALproex Sprinkle 125 milliGRAM(s) Oral at bedtime  LORazepam     Tablet 0.5 milliGRAM(s) Oral two times a day  memantine 5 milliGRAM(s) Oral two times a day  valproate sodium  IVPB 125 milliGRAM(s) IV Intermittent every 8 hours PRN        dextrose 5% + lactated ringers. 1000 milliLiter(s) IV Continuous <Continuous>  multivitamin 1 Tablet(s) Oral daily      FAMILY HISTORY:      Non-contributory    SOCIAL HISTORY:    [ ] Tobacco  [ ] Drugs  [ ] Alcohol    Allergies    penicillin (Unknown)  sulfa drugs (Unknown)    Intolerances    	    REVIEW OF SYSTEMS:  CONSTITUTIONAL: No fever  EYES: No eye pain, visual disturbances, or discharge  ENMT:  No difficulty hearing, tinnitus  NECK: No pain or stiffness  RESPIRATORY: No cough, wheezing,  CARDIOVASCULAR: No chest pain, palpitations, passing out, dizziness, or leg swelling  GASTROINTESTINAL:  No nausea, vomiting, diarrhea or constipation. No melena.  GENITOURINARY: No dysuria, hematuria  NEUROLOGICAL: No stroke like symptoms  SKIN: No burning or lesions   ENDOCRINE: No heat or cold intolerance  MUSCULOSKELETAL: No joint pain or swelling  PSYCHIATRIC: No  anxiety, mood swings  HEME/LYMPH: No bleeding gums  ALLERGY AND IMMUNOLOGIC: No hives or eczema	    All other ROS negative    PHYSICAL EXAM:  T(C): 37.1 (06-27-22 @ 20:50), Max: 37.1 (06-27-22 @ 20:50)  HR: 100 (06-27-22 @ 20:50) (72 - 100)  BP: 153/94 (06-27-22 @ 20:50) (115/76 - 209/61)  RR: 18 (06-27-22 @ 20:50) (17 - 20)  SpO2: 100% (06-27-22 @ 20:50) (95% - 100%)  Wt(kg): --  I&O's Summary      Appearance: Normal	  HEENT:   Normal oral mucosa, EOMI	  Cardiovascular:  S1 S2, No JVD,    Respiratory: Lungs clear to auscultation	  Psychiatry: Alert  Gastrointestinal:  Soft, Non-tender, + BS	  Skin: No rashes   Neurologic: Non-focal  Extremities:  No edema  Vascular: Peripheral pulses palpable    	    	  	  CARDIAC MARKERS:  Labs personally reviewed by me                                  9.2    16.34 )-----------( 294      ( 27 Jun 2022 04:20 )             30.8     06-27    139  |  106  |  52<H>  ----------------------------<  129<H>  4.4   |  20<L>  |  2.09<H>    Ca    9.1      27 Jun 2022 04:18  Mg     2.1     06-27    TPro  7.4  /  Alb  3.9  /  TBili  0.1<L>  /  DBili  x   /  AST  19  /  ALT  8<L>  /  AlkPhos  74  06-27          EKG: Personally reviewed by me -  LAFB QTc 514ms        Assessment /Plan:   Fall/syncope -- likely precipitated by UTI in elderly lady  EKG with mild prolonged QTc on antipsychotics  Repeat EKG to reassess         Advanced care planning/advanced directives discussed with patient/family. DNR status including forceful chest compressions to attempt to restart the heart, ventilator support/artificial breathing, electric shock, artificial nutrition, health care proxy, Molst form all discussed with pt. Pt wishes to consider. More than fifteen minutes spent on discussing advanced directives.         Manuel Ibarra DO [facility]  Cardiovascular Medicine  [address] [place] [address], [address]  Office [phone]  Cell [phone]
[facility] Neurological [facility]        Patient is a 89y old  Female who presents with a chief complaint of        89 year old F with PMH HTN, DM (not on meds), dementia, DVT on eliquis BIBEMS after an unwitnessed fall at home. Aide heard her fall. Saw her on ground. Said she was lethargic immediately after. Patient arrived in C-collar. Laceration above R eyebrow. Cannot provide further history    aide at bedside reports that she has advanced dementia 24 hr aide   dependent of all adls  impulsive   ambulates with walker and help of aide   does sundown as per aide          *****PAST MEDICAL / Surgical  HISTORY:  PAST MEDICAL & SURGICAL HISTORY:  Diabetes mellitus      HTN (hypertension)      Dementia               *****FAMILY HISTORY:  FAMILY HISTORY:           *****SOCIAL HISTORY:  Alcohol: None  Smoking: None         *****ALLERGIES:   Allergies    penicillin (Unknown)  sulfa drugs (Unknown)    Intolerances             *****MEDICATIONS: current medication reviewed and documented.   MEDICATIONS  (STANDING):  metoprolol tartrate Injectable 5 milliGRAM(s) IV Push once    MEDICATIONS  (PRN):           *****REVIEW OF SYSTEM:  GEN: no fever, no chills, no pain  RESP: no SOB, no cough, no sputum  CVS: no chest pain, no palpitations, no edema  GI: no abdominal pain, no nausea, no vomiting, no constipation, no diarrhea  : no dysurea, no frequency, no hematurea  Neuro: no headache, no dizziness  PSYCH: no anxiety, no depression  Derm : no itching, no rash         *****VITAL SIGNS:  T(C): --  HR: 73 (22 @ 15:15) (72 - 96)  BP: 209/61 (22 @ 15:15) (115/76 - 209/61)  RR: 20 (22 @ 15:15) (17 - 20)  SpO2: 99% (22 @ 15:15) (96% - 99%)  Wt(kg): --           *****PHYSICAL EXAM:   Alert oriented x0      Attention comprehension are poor .  nonsensical speech   not following commands   aide at beside          EOMI fundi not visualized,  blinks to threat     No facial asymmetry   Tongue is midline       minimal mvt of the b/l upper and lower ext     sensation is grossly symmetric  Gait : not assessed.  B/L down going toes               *****LAB AND IMAGIN.2    16.34 )-----------( 294      ( 2022 04:20 )             30.8                   139  |  106  |  52<H>  ----------------------------<  129<H>  4.4   |  20<L>  |  2.09<H>    Ca    9.1      2022 04:18  Mg     2.1         TPro  7.4  /  Alb  3.9  /  TBili  0.1<L>  /  DBili  x   /  AST  19  /  ALT  8<L>  /  AlkPhos  74      PT/INR - ( 2022 04:20 )   PT: 17.0 sec;   INR: 1.47 ratio         PTT - ( 2022 04:20 )  PTT:31.3 sec                        Urinalysis Basic - ( 2022 09:43 )    Color: Light Yellow / Appearance: Slightly Turbid / S.012 / pH: x  Gluc: x / Ketone: Negative  / Bili: Negative / Urobili: Negative   Blood: x / Protein: 30 mg/dL / Nitrite: Positive   Leuk Esterase: Large / RBC: 2 /hpf / WBC 48 /HPF   Sq Epi: x / Non Sq Epi: 1 /hpf / Bacteria: Many        [All pertinent recent Imaging reports reviewed]         *****A S S E S S M E N T   A N D   P L A N :     Patient is a 89y old  Female who presents with a chief complaint of        89 year old F with PMH HTN, DM (not on meds), dementia, DVT on eliquis BIBEMS after an unwitnessed fall at home. Aide heard her fall. Saw her on ground. Said she was lethargic immediately after. Patient arrived in C-collar. Laceration above R eyebrow. Cannot provide further history    aide at bedside reports that she has advanced dementia 24 hr aide   dependent of all adls  impulsive   ambulates with walker and help of aide   does sundown as per aide     Problem/Recommendations 1:  fall without any sig fracture  noted left scalp hematoma   will continue to monitor      Problem/Recommendations 2: ams   likely multifactorial, related to uti, fall worsening underlying dementia  currently on abx   ua +   urine culture pending   dc aricept due to worsening agitation   depakote prn for agitation/sundowning   will taper down ativan due to increased risk of falls.          pt at risk for developing delirium, therefore please institute the following preventative measures if possible          - initiating early mobilization          -minimizing "tethers" - IV, oxygen, catheters, etc          -avoiding   sedatives          -maintaining hydration/nutrition          -avoid anticholinergics - diphenhydramine, etc          -pain control          -sleep wake cycle regulation; avoid day time somnolence           -supportive environment    ___________________________  Will follow with you.  Thank you,  Urszula Funez MD  Diplomate of the American Board of Neurology and Psychiatry.  Diplomate of the American Board of Vascular Neurology.   [facility] Neurological [facility] ([facility]), [facility]   Ph: [phone]    Differential diagnosis and plan of care discussed with patient after the evaluation.   Advanced care planning options discussed.   Pain assessed and judicious use of narcotics when appropriate was discussed.  Importance of Fall prevention discussed.  Counseling on Smoking and Alcohol cessation was offered when appropriate.  Counseling on Diet, exercise, and medication compliance was done.   83 minutes spent on the total encounter;  more than 50 % of the visit was spent on counseling  and or coordinating care by the attending physician.    Thank you for allowing me to participate in the care of this alexa patient. Please do not hesitate to call me if you have any questions.     This and subsequent notes  will  inherently be subject to errors including those of syntax and substitutions which may escape proofreading. In such instances original meaning may be extrapolated by contextual derivation.   
[facility], Division of Infectious Diseases  SEEMA Connor S. Shah, Y. Patel, G. Christopher  [phone]  ([phone] - weekdays after 5pm and weekends)    NIYA FINNEGAN  89y, Female  [id number]    HPI:  Patient is a 89 year old female with PMH HTN, DM (not on meds), dementia, DVT on eliquis BIBEMS after an unwitnessed fall at home. Aide heard her fall. Saw her on ground. Said she was lethargic immediately after. Patient arrived in C-collar. Laceration above left eyebrow. Patient seen in the ER, HHA at bedside but states she was not present with patient when she had her fall. Patient has advanced dementia per HHA and unable to provide any history.   ROS: unable to obtain full ROS    Allergies: penicillin (Unknown)  sulfa drugs (Unknown)    PMH -- Diabetes mellitus  HTN (hypertension)  Dementia  PSH -- no known surgeries   FH --noncontributory   Social History -- denies tobacco, alcohol or illicit drug use    Physical Exam--  Vital Signs Last 24 Hrs  T(F): --  HR: 73 (2022 17:00) (72 - 96)  BP: 197/69 (2022 17:00) (115/76 - 209/61)  RR: 18 (2022 17:00) (17 - 20)  SpO2: 99% (2022 17:00) (96% - 99%)  General: no acute distress  HEENT: NC, L above eyebrow laceration, L eye swelling, neck supple  Lungs: Clear bilaterally without rales, wheezing or rhonchi  Heart: S1 and S2 present, normal rate  Abdomen: Soft. Nondistended. Nontender. BS present.   Neuro: AAOx0, follows some simple commands   Extremities: No cyanosis or clubbing. No edema.   Skin: Warm. Dry. Good turgor. No visible rash  Lines: PIV    Laboratory & Imaging Data--  CBC:                       9.2    16.34 )-----------( 294      ( 2022 04:20 )             30.8     WBC Count: 16.34 K/uL (-22 @ 04:20)    CMP:     139  |  106  |  52<H>  ----------------------------<  129<H>  4.4   |  20<L>  |  2.09<H>    Ca    9.1      2022 04:18  Mg     2.1         TPro  7.4  /  Alb  3.9  /  TBili  0.1<L>  /  DBili  x   /  AST  19  /  ALT  8<L>  /  AlkPhos  74      LIVER FUNCTIONS - ( 2022 04:18 )  Alb: 3.9 g/dL / Pro: 7.4 g/dL / ALK PHOS: 74 U/L / ALT: 8 U/L / AST: 19 U/L / GGT: x           Urinalysis Basic - ( 2022 09:43 )  Color: Light Yellow / Appearance: Slightly Turbid / S.012 / pH: x  Gluc: x / Ketone: Negative  / Bili: Negative / Urobili: Negative   Blood: x / Protein: 30 mg/dL / Nitrite: Positive   Leuk Esterase: Large / RBC: 2 /hpf / WBC 48 /HPF   Sq Epi: x / Non Sq Epi: 1 /hpf / Bacteria: Many    Microbiology:    - Flu/RSV/COVID - negative     Radiology--reviewed  CT Maxillofacial No Cont (22 @ 08:47) >IMPRESSION: No evidence of a left-sided zygomatic arch fracture. Moderate-sized left-sided inferolateral frontal scalp hematoma with additional soft tissue swelling tracking towards the left periorbital soft tissues and left temporal scalp.    CT Head C Spine No Cont (22 @ 05:29) >IMPRESSION: Head CT: No acute intracranial hemorrhage, mass effect, or midline shift. Left scalp hematoma extends inferiorly up to the left cheek without underlying acute osseous abnormalities.  Cervical Spine CT: No acute fracture. Cervical degenerative changes results in varying degrees of neural foraminal and spinal canal narrowing.    Xray Chest 1 View- PORTABLE-Urgent (Xray Chest 1 View- PORTABLE-Urgent .) (22 @ 04:57) >IMPRESSION: No focal consolidation. No pneumothorax    Xray Pelvis AP only (22 @ 05:00) >IMPRESSION: No acute displaced fracture. Mild bilateral hip arthrosis. Surgical clips and suture material noted within the pelvis. Vascular stent noted. Atherosclerotic calcifications are noted.    Active Medications--dextrose 5% + lactated ringers. 1000 milliLiter(s) IV Continuous <Continuous>  diVALproex Sprinkle 125 milliGRAM(s) Oral at bedtime  enalaprilat Injectable 1.25 milliGRAM(s) IV Push every 6 hours  ertapenem  IVPB      ertapenem  IVPB 500 milliGRAM(s) IV Intermittent once  hydrALAZINE Injectable 10 milliGRAM(s) IV Push every 8 hours PRN  LORazepam     Tablet 0.5 milliGRAM(s) Oral two times a day  memantine 5 milliGRAM(s) Oral two times a day  multivitamin 1 Tablet(s) Oral daily  thiamine Injectable 100 milliGRAM(s) IV Push once  valproate sodium  IVPB 125 milliGRAM(s) IV Intermittent every 8 hours PRN    Antimicrobials: ertapenem  IVPB      ertapenem  IVPB 500 milliGRAM(s) IV Intermittent once

## 2022-06-28 NOTE — PROGRESS NOTE ADULT - SUBJECTIVE AND OBJECTIVE BOX
[facility]      Seen earlier today, and examined.  - Today, patient is without complaints.           *****MEDICATIONS: Current medication reviewed and documented.    MEDICATIONS  (STANDING):  dextrose 5% + lactated ringers. 1000 milliLiter(s) (75 mL/Hr) IV Continuous <Continuous>  diVALproex Sprinkle 125 milliGRAM(s) Oral at bedtime  enalaprilat Injectable 1.25 milliGRAM(s) IV Push every 6 hours  ertapenem  IVPB      ertapenem  IVPB 500 milliGRAM(s) IV Intermittent every 24 hours  LORazepam     Tablet 0.5 milliGRAM(s) Oral two times a day  memantine 5 milliGRAM(s) Oral two times a day  multivitamin 1 Tablet(s) Oral daily  rivaroxaban 10 milliGRAM(s) Oral daily    MEDICATIONS  (PRN):  hydrALAZINE Injectable 10 milliGRAM(s) IV Push every 8 hours PRN SBP above 150  valproate sodium  IVPB 125 milliGRAM(s) IV Intermittent every 8 hours PRN agitation          ***** VITAL SIGNS:  T(F): 98.7 (22 @ 15:26), Max: 98.8 (22 @ 20:50)  HR: 90 (22 @ 15:26) (65 - 114)  BP: 152/70 (22 @ 15:26) (144/67 - 197/69)  RR: 18 (22 @ 15:26) (18 - 19)  SpO2: 95% (22 @ 15:26) (95% - 100%)  Wt(kg): --  ,   I&O's Summary           *****PHYSICAL EXAM: sleeping   speech is clearer today      alert somewhat agitated   not following any commands   Gait not assessed.            *****LAB AND IMAGIN.5    11.78 )-----------( 274      ( 2022 11:05 )             29.4                   145  |  112<H>  |  37<H>  ----------------------------<  107<H>  4.4   |  22  |  1.62<H>    Ca    9.3      2022 11:05  Mg     2.1         TPro  7.4  /  Alb  3.9  /  TBili  0.1<L>  /  DBili  x   /  AST  19  /  ALT  8<L>  /  AlkPhos  74      PT/INR - ( 2022 04:20 )   PT: 17.0 sec;   INR: 1.47 ratio         PTT - ( 2022 04:20 )  PTT:31.3 sec                   Urinalysis Basic - ( 2022 09:43 )    Color: Light Yellow / Appearance: Slightly Turbid / S.012 / pH: x  Gluc: x / Ketone: Negative  / Bili: Negative / Urobili: Negative   Blood: x / Protein: 30 mg/dL / Nitrite: Positive   Leuk Esterase: Large / RBC: 2 /hpf / WBC 48 /HPF   Sq Epi: x / Non Sq Epi: 1 /hpf / Bacteria: Many      [All pertinent recent Imaging/Reports reviewed]           *****A S S E S S M E N T   A N D   P L A N :       Patient is a 89y old  Female who presents with a chief complaint of        89 year old F with PMH HTN, DM (not on meds), dementia, DVT on eliquis BIBEMS after an unwitnessed fall at home. Aide heard her fall. Saw her on ground. Said she was lethargic immediately after. Patient arrived in C-collar. Laceration above R eyebrow. Cannot provide further history    aide at bedside reports that she has advanced dementia 24 hr aide   dependent of all adls  impulsive   ambulates with walker and help of aide   does sundown as per aide     Problem/Recommendations 1:  fall without any sig fracture  noted left scalp hematoma   will continue to monitor      Problem/Recommendations 2: ams   likely multifactorial, related to uti, fall worsening underlying dementia  currently on abx   ua +   urine culture pending   dc aricept due to worsening agitation   depakote prn for agitation/sundowning   will taper down ativan due to increased risk of falls.       sleep wake cycle regulation     Thank you for allowing me to participate in the care of this patient. Will continue to follow patient periodically. Please do not hesitate to call me if you have any  questions or if there has been a change in patients neurological status     ________________  Urszula Funez MD  [facility]  [phone]      33 minutes spent on total encounter; more than 50 % of the visit was  spent counseling about plan of care, compliance to diet/exercise and medication regimen and or  coordinating care by the attending physician.      It is advised that stroke patients follow up with JAXON Hayward @ [phone] in 1- 2 weeks.   Others please follow up with Dr. Michael Nissenbaum [phone]

## 2022-06-28 NOTE — PATIENT PROFILE ADULT - FALL HARM RISK - HARM RISK INTERVENTIONS
Assistance with ambulation/Assistance OOB with selected safe patient handling equipment/Communicate Risk of Fall with Harm to all staff/Discuss with provider need for PT consult/Monitor for mental status changes/Monitor gait and stability/Move patient closer to nurses' station/Reinforce activity limits and safety measures with patient and family/Reorient to person, place and time as needed/Tailored Fall Risk Interventions/Toileting schedule using arm’s reach rule for commode and bathroom/Use of alarms - bed, chair and/or voice tab/Visual Cue: Yellow wristband and red socks/Bed in lowest position, wheels locked, appropriate side rails in place/Call bell, personal items and telephone in reach/Instruct patient to call for assistance before getting out of bed or chair/Non-slip footwear when patient is out of bed/[place] to call system/Physically safe environment - no spills, clutter or unnecessary equipment/Purposeful Proactive Rounding/Room/bathroom lighting operational, light cord in reach

## 2022-06-28 NOTE — SWALLOW BEDSIDE ASSESSMENT ADULT - ASR SWALLOW ASPIRATION MONITOR
Monitor for s/s aspiration/laryngeal penetration. If noted:  D/C p.o. intake, provide non-oral nutrition/hydration/meds, and contact this service @ [phone]/change of breathing pattern/cough/gurgly voice/fever/pneumonia/throat clearing/upper respiratory infection

## 2022-06-28 NOTE — SWALLOW BEDSIDE ASSESSMENT ADULT - PHARYNGEAL PHASE
suspected latency in the swallow response and palpable hyolaryngeal elevation. No overt s/s aspiration observed.

## 2022-06-28 NOTE — PROGRESS NOTE ADULT - SUBJECTIVE AND OBJECTIVE BOX
DATE OF SERVICE: 06-28-22 @ 14:44    Patient is a 89y old  Female who presents with a chief complaint of metabolic encephalopathy (28 Jun 2022 09:29)      INTERVAL HISTORY: Unable to obtain ROS from patient d/t severe dementia. Daughter Mary at bedside.     REVIEW OF SYSTEMS: Non-contributory  CONSTITUTIONAL: No weakness  EYES/ENT: No visual changes;  No throat pain   NECK: No pain or stiffness  RESPIRATORY: No cough, wheezing; No shortness of breath  CARDIOVASCULAR: No chest pain or palpitations  GASTROINTESTINAL: No abdominal  pain. No nausea, vomiting, or hematemesis  GENITOURINARY: No dysuria, frequency or hematuria  NEUROLOGICAL: No stroke like symptoms  SKIN: No rashes    	  MEDICATIONS:  enalaprilat Injectable 1.25 milliGRAM(s) IV Push every 6 hours  hydrALAZINE Injectable 10 milliGRAM(s) IV Push every 8 hours PRN        PHYSICAL EXAM:  T(C): 36.4 (06-28-22 @ 09:29), Max: 37.1 (06-27-22 @ 20:50)  HR: 91 (06-28-22 @ 09:29) (65 - 114)  BP: 160/66 (06-28-22 @ 09:29) (144/67 - 209/61)  RR: 19 (06-28-22 @ 09:29) (18 - 20)  SpO2: 97% (06-28-22 @ 09:29) (95% - 100%)  Wt(kg): --  I&O's Summary        Appearance: In no distress	  HEENT:    PERRL, EOMI	  Cardiovascular:  S1 S2, No JVD  Respiratory: Lungs clear to auscultation	  Gastrointestinal:  Soft, Non-tender, + BS	  Vascularature:  No edema of LE  Psychiatric: Appropriate affect   Neuro: no acute focal deficits                               9.5    11.78 )-----------( 274      ( 28 Jun 2022 11:05 )             29.4     06-28    145  |  112<H>  |  37<H>  ----------------------------<  107<H>  4.4   |  22  |  1.62<H>    Ca    9.3      28 Jun 2022 11:05  Mg     2.1     06-27    TPro  7.4  /  Alb  3.9  /  TBili  0.1<L>  /  DBili  x   /  AST  19  /  ALT  8<L>  /  AlkPhos  74  06-27        Labs personally reviewed      ASSESSMENT/PLAN: 	    Ms. Meghna Claire is a 89 yo female with advanced dementia who lives at home with 24 hour care who presents to [facility] after fall at home. Found to have UTI.    Problem/Plan -1  Problem: Fall/Syncope  - Likely precipitated by UTI in elderly lady  - EKG with mild prolonged QTc on antipsychotics  - Will repeat EKG  - CT head negative for hemorrhage or infarct  - IV ABx as per primary team  - Neurology following    Problem/Plan -2  Problem: Elevated Troponin  - Trop flat 106 --> 102  - EKG with no acute ischemic characteristics    Problem/Plan -3  Problem: HTN  - c/w IV enalapril with hold parameters        ANAYA Guadalupe-JAXON Ibarra DO [facility]  Cardiovascular Medicine  [address] [place] [address], [address]  Office: [phone]  Cell: [phone] DATE OF SERVICE: 06-28-22 @ 14:44    Patient is a 89y old  Female who presents with a chief complaint of metabolic encephalopathy (28 Jun 2022 09:29)      INTERVAL HISTORY: Unable to obtain ROS from patient d/t severe dementia. Daughter Mary at bedside.     REVIEW OF SYSTEMS: Non-contributory  CONSTITUTIONAL: No weakness  EYES/ENT: No visual changes;  No throat pain   NECK: No pain or stiffness  RESPIRATORY: No cough, wheezing; No shortness of breath  CARDIOVASCULAR: No chest pain or palpitations  GASTROINTESTINAL: No abdominal  pain. No nausea, vomiting, or hematemesis  GENITOURINARY: No dysuria, frequency or hematuria  NEUROLOGICAL: No stroke like symptoms  SKIN: No rashes    	  MEDICATIONS:  enalaprilat Injectable 1.25 milliGRAM(s) IV Push every 6 hours  hydrALAZINE Injectable 10 milliGRAM(s) IV Push every 8 hours PRN        PHYSICAL EXAM:  T(C): 36.4 (06-28-22 @ 09:29), Max: 37.1 (06-27-22 @ 20:50)  HR: 91 (06-28-22 @ 09:29) (65 - 114)  BP: 160/66 (06-28-22 @ 09:29) (144/67 - 209/61)  RR: 19 (06-28-22 @ 09:29) (18 - 20)  SpO2: 97% (06-28-22 @ 09:29) (95% - 100%)  Wt(kg): --  I&O's Summary        Appearance: In no distress	  HEENT:    PERRL, EOMI	  Cardiovascular:  S1 S2, No JVD  Respiratory: Lungs clear to auscultation	  Gastrointestinal:  Soft, Non-tender, + BS	  Vascularature:  No edema of LE  Psychiatric: Appropriate affect   Neuro: no acute focal deficits                               9.5    11.78 )-----------( 274      ( 28 Jun 2022 11:05 )             29.4     06-28    145  |  112<H>  |  37<H>  ----------------------------<  107<H>  4.4   |  22  |  1.62<H>    Ca    9.3      28 Jun 2022 11:05  Mg     2.1     06-27    TPro  7.4  /  Alb  3.9  /  TBili  0.1<L>  /  DBili  x   /  AST  19  /  ALT  8<L>  /  AlkPhos  74  06-27        Labs personally reviewed      ASSESSMENT/PLAN: 	    Ms. Meghna Claire is a 89 yo female with advanced dementia who lives at home with 24 hour care who presents to [facility] after fall at home. Found to have UTI.    Problem/Plan -1  Problem: Fall/Syncope  - Likely precipitated by UTI in elderly lady  - EKG with mild prolonged QTc on antipsychotics  - Repeat EKG  - CT head negative for hemorrhage or infarct  - IV ABx as per primary team  - Neurology following    Problem/Plan -2  Problem: Elevated Troponin  - Trop flat 106 --> 102  - EKG with no acute ischemic characteristics  - Likely demand secondary to acute infection    Problem/Plan -3  Problem: HTN  - c/w IV enalapril with hold parameters        ANAYA Guadalupe-NP   Manuel Ibarra DO [facility]  Cardiovascular Medicine  [address], [address]  Office: [phone]  Cell: [phone]

## 2022-06-28 NOTE — PROGRESS NOTE ADULT - ASSESSMENT
Patient is a 89 year old female with PMH HTN, DM (not on meds), dementia, DVT on eliquis BIBEMS after an unwitnessed fall at home.    Acute cystitis/UTI   L scalp laceration s/p repair  Unwitnessed fall at home  Leukocytosis likely d/t to above   H/o advanced dementia   H/o PCN and sulfa allergies - unknown reactions    - afebrile in ER, laceration repaired in ER, WBC noted    - UA with pyuria - 48 WBC, large LE, many bacteria and +nitrites   - imaging reviewed, has L scalp hematoma, no collections/abscess, no pna   - Neurology following    - follow urine culture, in process    - continue on ertapenem 500mg IV Q24h   - monitor temps/WBC   - pain management per primary team   - aspiration precautions       Nataly Elizabeth M.D.  [facility], Division of Infectious Diseases  [phone]  After 5pm on weekdays and all day on weekends - please call [phone]   Patient is a 89 year old female with PMH HTN, DM (not on meds), dementia, DVT on eliquis BIBEMS after an unwitnessed fall at home.    Acute cystitis/UTI   L scalp laceration s/p repair  Unwitnessed fall at home  Leukocytosis likely d/t to above   H/o advanced dementia   H/o PCN and sulfa allergies - unknown reactions    - afebrile in ER, laceration repaired in ER, WBC noted    - UA with pyuria - 48 WBC, large LE, many bacteria and +nitrites   - imaging reviewed, has L scalp hematoma, no collections/abscess, no pna   - Neurology following    - follow urine culture for GNR ID and sensitivities     - continue on ertapenem 500mg IV Q24h   - monitor temps/WBC   - pain management per primary team   - aspiration precautions       Nataly Elizabeth M.D.  [facility], Division of Infectious Diseases  [phone]  After 5pm on weekdays and all day on weekends - please call [phone]

## 2022-06-28 NOTE — PROGRESS NOTE ADULT - ASSESSMENT
Patient is a 89 year old female with PMH HTN, DM (not on meds), dementia, DVT on eliquis BIBEMS after an unwitnessed fall at home.  Ptn is altered, has  Acute cystits/UTI  L scalp laceration repaired. Tdap given  Unwitnessed fall at home  Leukocytosis likely d/t to above   H/o advanced dementia   H/o PCN and sulfa allergies - unknown reactions    - afebrile in ER, laceration repaired in ER, WBC noted    - UA with pyuria - growing ESBL Klebsielaa, on INVANZ through 7/1   - imaging reviewed, has L scalp hematoma, no collections/abscess, no pna   - Neurology and ID following    - follow blood and urine cultures   - on Valproate at hs and prn  - will cont  Namenda 5 mg bid  - cont iv vasotec and hydralazine for BP control   - pr interval is prolonged  - thiamine iv x 1 given, cont daily  MVI  - IVF   - aspiration precautions   - dvt ppx w po Xarelto  - speech and swallow eval pending, for now on dysphagia 1 diet but not eating/drinking

## 2022-06-28 NOTE — PROGRESS NOTE ADULT - SUBJECTIVE AND OBJECTIVE BOX
Patient is a 89y old  Female who presents with a chief complaint of metabolic encephalopathy (2022 15:51)      SUBJECTIVE / OVERNIGHT EVENTS:    MEDICATIONS  (STANDING):  dextrose 5% + lactated ringers. 1000 milliLiter(s) (75 mL/Hr) IV Continuous <Continuous>  diVALproex Sprinkle 125 milliGRAM(s) Oral at bedtime  enalaprilat Injectable 1.25 milliGRAM(s) IV Push every 6 hours  ertapenem  IVPB      ertapenem  IVPB 500 milliGRAM(s) IV Intermittent every 24 hours  LORazepam     Tablet 0.5 milliGRAM(s) Oral two times a day  memantine 5 milliGRAM(s) Oral two times a day  multivitamin 1 Tablet(s) Oral daily  rivaroxaban 10 milliGRAM(s) Oral daily    MEDICATIONS  (PRN):  hydrALAZINE Injectable 10 milliGRAM(s) IV Push every 8 hours PRN SBP above 150  valproate sodium  IVPB 125 milliGRAM(s) IV Intermittent every 8 hours PRN agitation      Vital Signs Last 24 Hrs  T(F): 98.6 (22 @ 22:15), Max: 98.7 (22 @ 15:26)  HR: 98 (22 @ 22:15) (65 - 114)  BP: 170/98 (22 @ 22:15) (144/67 - 186/73)  RR: 18 (22 @ 22:15) (18 - 19)  SpO2: 95% (22 @ 22:15) (94% - 98%)  Telemetry:   CAPILLARY BLOOD GLUCOSE      POCT Blood Glucose.: 105 mg/dL (2022 09:03)    I&O's Summary      PHYSICAL EXAM:  GENERAL: NAD, well-developed  HEAD:  Atraumatic, Normocephalic  EYES: EOMI, PERRLA, conjunctiva and sclera clear  NECK: Supple, No JVD  CHEST/LUNG: Clear to auscultation bilaterally; No wheeze  HEART: Regular rate and rhythm; No murmurs, rubs, or gallops  ABDOMEN: Soft, Nontender, Nondistended; Bowel sounds present  EXTREMITIES:  2+ Peripheral Pulses, No clubbing, cyanosis, or edema  PSYCH: AAOx3  NEUROLOGY: non-focal  SKIN: No rashes or lesions    LABS:                        9.5    11.78 )-----------( 274      ( 2022 11:05 )             29.4         145  |  112<H>  |  37<H>  ----------------------------<  107<H>  4.4   |  22  |  1.62<H>    Ca    9.3      2022 11:05  Mg     2.1         TPro  7.4  /  Alb  3.9  /  TBili  0.1<L>  /  DBili  x   /  AST  19  /  ALT  8<L>  /  AlkPhos  74      PT/INR - ( 2022 04:20 )   PT: 17.0 sec;   INR: 1.47 ratio         PTT - ( 2022 04:20 )  PTT:31.3 sec      Urinalysis Basic - ( 2022 09:43 )    Color: Light Yellow / Appearance: Slightly Turbid / S.012 / pH: x  Gluc: x / Ketone: Negative  / Bili: Negative / Urobili: Negative   Blood: x / Protein: 30 mg/dL / Nitrite: Positive   Leuk Esterase: Large / RBC: 2 /hpf / WBC 48 /HPF   Sq Epi: x / Non Sq Epi: 1 /hpf / Bacteria: Many        RADIOLOGY & ADDITIONAL TESTS:    Imaging Personally Reviewed:    Consultant(s) Notes Reviewed:      Care Discussed with Consultants/Other Providers:   Patient is a 89y old  Female who presents with a chief complaint of metabolic encephalopathy (2022 15:51)      SUBJECTIVE / OVERNIGHT EVENTS: MS has improved but still not at baseline. ptn w echolalia, doesn't answer questions. poor po intake still    MEDICATIONS  (STANDING):  dextrose 5% + lactated ringers. 1000 milliLiter(s) (75 mL/Hr) IV Continuous <Continuous>  diVALproex Sprinkle 125 milliGRAM(s) Oral at bedtime  enalaprilat Injectable 1.25 milliGRAM(s) IV Push every 6 hours  ertapenem  IVPB      ertapenem  IVPB 500 milliGRAM(s) IV Intermittent every 24 hours  LORazepam     Tablet 0.5 milliGRAM(s) Oral two times a day  memantine 5 milliGRAM(s) Oral two times a day  multivitamin 1 Tablet(s) Oral daily  rivaroxaban 10 milliGRAM(s) Oral daily    MEDICATIONS  (PRN):  hydrALAZINE Injectable 10 milliGRAM(s) IV Push every 8 hours PRN SBP above 150  valproate sodium  IVPB 125 milliGRAM(s) IV Intermittent every 8 hours PRN agitation      Vital Signs Last 24 Hrs  T(F): 98.6 (22 @ 22:15), Max: 98.7 (22 @ 15:26)  HR: 98 (22 @ 22:15) (65 - 114)  BP: 170/98 (22 @ 22:15) (144/67 - 186/73)  RR: 18 (22 @ 22:15) (18 - 19)  SpO2: 95% (22 @ 22:15) (94% - 98%)  Telemetry:   CAPILLARY BLOOD GLUCOSE      POCT Blood Glucose.: 105 mg/dL (2022 09:03)    I&O's Summary      PHYSICAL EXAM:  GENERAL: NAD, well-developed  HEAD:  Atraumatic, Normocephalic  EYES: EOMI, PERRLA, conjunctiva and sclera clear  NECK: Supple, No JVD  CHEST/LUNG: Clear to auscultation bilaterally; No wheeze  HEART: Regular rate and rhythm; No murmurs, rubs, or gallops  ABDOMEN: Soft, Nontender, Nondistended; Bowel sounds present  EXTREMITIES:  2+ Peripheral Pulses, No clubbing, cyanosis, or edema  PSYCH: AAOx3  NEUROLOGY: non-focal  SKIN: No rashes or lesions    LABS:                        9.5    11.78 )-----------( 274      ( 2022 11:05 )             29.4         145  |  112<H>  |  37<H>  ----------------------------<  107<H>  4.4   |  22  |  1.62<H>    Ca    9.3      2022 11:05  Mg     2.1         TPro  7.4  /  Alb  3.9  /  TBili  0.1<L>  /  DBili  x   /  AST  19  /  ALT  8<L>  /  AlkPhos  74      PT/INR - ( 2022 04:20 )   PT: 17.0 sec;   INR: 1.47 ratio         PTT - ( 2022 04:20 )  PTT:31.3 sec      Urinalysis Basic - ( 2022 09:43 )    Color: Light Yellow / Appearance: Slightly Turbid / S.012 / pH: x  Gluc: x / Ketone: Negative  / Bili: Negative / Urobili: Negative   Blood: x / Protein: 30 mg/dL / Nitrite: Positive   Leuk Esterase: Large / RBC: 2 /hpf / WBC 48 /HPF   Sq Epi: x / Non Sq Epi: 1 /hpf / Bacteria: Many        RADIOLOGY & ADDITIONAL TESTS:    Imaging Personally Reviewed:    Consultant(s) Notes Reviewed:      Care Discussed with Consultants/Other Providers:

## 2022-06-28 NOTE — SWALLOW BEDSIDE ASSESSMENT ADULT - SWALLOW EVAL: DIAGNOSIS
89 year old F with PMH HTN, DM (not on meds), dementia, DVT on eliquis BIBEMS after an unwitnessed fall at home. Patient seen for bedside swallow evaluation to assess swallow function and rule out aspiration. 89 year old F with PMH HTN, DM (not on meds), dementia, DVT on eliquis BIBEMS after an unwitnessed fall at home. Patient seen for bedside swallow evaluation to assess swallow function and rule out aspiration. Evaluation limited due to patient's poor participation, however, oropharyngeal skills appear functional for soft-bite sized solids/thin liquids. Deficits: slowed however eventually effective mastication with suspected latency in the swallow response and palpable hyolaryngeal elevation. No overt signs/symptoms of laryngeal penetration/aspiration observed. 89 year old F with PMH HTN, DM (not on meds), dementia, DVT on eliquis BIBEMS after an unwitnessed fall at home. Patient seen for bedside swallow evaluation to assess swallow function and rule out aspiration. Evaluation limited due to patient's poor participation, likely due to advanced dementia, however, oropharyngeal skills appear functional for soft-bite sized solids/thin liquids. Deficits: slowed however eventually effective mastication with suspected latency in the swallow response and palpable hyolaryngeal elevation. No overt signs/symptoms of laryngeal penetration/aspiration observed.

## 2022-06-28 NOTE — SWALLOW BEDSIDE ASSESSMENT ADULT - SLP GENERAL OBSERVATIONS
Patient encountered semi-supine in bed, on room air, sleeping. Pt aroused via verbal and tactile stimuli, however extremely agitated. Patient declined to state name/ or current date. Pt required maximum encouragement to accept limited trials from clinician and intermittently combative.

## 2022-06-28 NOTE — PROGRESS NOTE ADULT - SUBJECTIVE AND OBJECTIVE BOX
[facility], Division of Infectious Diseases  SEEMA Connor Y. Patel, S. Shah, G. Casimir  [phone]  ([phone] - weekdays after 5pm and weekends)    Name: NIYA FINNEGAN  Age/Gender: 89y Female  MRN: [id number]    Interval History:  Patient seen and examined this morning.  Patient confused, unable to obtain ROS.   Notes reviewed. Afebrile   Allergies: penicillin (Unknown)  sulfa drugs (Unknown)      Objective:  Vitals:   T(F): 98.4 (22 @ 05:27), Max: 98.8 (22 @ 20:50)  HR: 65 (22 @ 05:27) (65 - 114)  BP: 144/67 (22 @ 05:27) (144/67 - 209/61)  RR: 18 (22 @ 05:27) (17 - 20)  SpO2: 98% (22 @ 05:27) (95% - 100%)  Physical Examination:  General: no acute distress  HEENT: NC, L facial bruising, anicteric, neck supple  Respiratory: clear to auscultation bilaterally  Cardiovascular: S1 and S2 present, normal rate   Gastrointestinal: soft, nontender, nondistended  Neuro: AAOx0, confused  Extremities: no edema, no cyanosis  Skin: no visible rash    Laboratory Studies:  CBC:                       9.2    16.34 )-----------( 294      ( 2022 04:20 )             30.8     WBC Trend:  16.34 22 @ 04:20    CMP:     139  |  106  |  52<H>  ----------------------------<  129<H>  4.4   |  20<L>  |  2.09<H>    Ca    9.1      2022 04:18  Mg     2.1         TPro  7.4  /  Alb  3.9  /  TBili  0.1<L>  /  DBili  x   /  AST  19  /  ALT  8<L>  /  AlkPhos  74      Creatinine, Serum: 2.09 mg/dL (22 @ 04:18)    LIVER FUNCTIONS - ( 2022 04:18 )  Alb: 3.9 g/dL / Pro: 7.4 g/dL / ALK PHOS: 74 U/L / ALT: 8 U/L / AST: 19 U/L / GGT: x           Urinalysis Basic - ( 2022 09:43 )  Color: Light Yellow / Appearance: Slightly Turbid / S.012 / pH: x  Gluc: x / Ketone: Negative  / Bili: Negative / Urobili: Negative   Blood: x / Protein: 30 mg/dL / Nitrite: Positive   Leuk Esterase: Large / RBC: 2 /hpf / WBC 48 /HPF   Sq Epi: x / Non Sq Epi: 1 /hpf / Bacteria: Many    Microbiology: reviewed    - Flu/RSV/COVID - negative     Radiology: reviewed   CT Maxillofacial No Cont (22 @ 08:47) >IMPRESSION: No evidence of a left-sided zygomatic arch fracture. Moderate-sized left-sided inferolateral frontal scalp hematoma with additional soft tissue swelling tracking towards the left periorbital soft tissues and left temporal scalp.    CT Head C Spine No Cont (22 @ 05:29) >IMPRESSION: Head CT: No acute intracranial hemorrhage, mass effect, or midline shift. Left scalp hematoma extends inferiorly up to the left cheek without underlying acute osseous abnormalities.  Cervical Spine CT: No acute fracture. Cervical degenerative changes results in varying degrees of neural foraminal and spinal canal narrowing.    Xray Chest 1 View- PORTABLE-Urgent (Xray Chest 1 View- PORTABLE-Urgent .) (22 @ 04:57) >IMPRESSION: No focal consolidation. No pneumothorax    Xray Pelvis AP only (22 @ 05:00) >IMPRESSION: No acute displaced fracture. Mild bilateral hip arthrosis. Surgical clips and suture material noted within the pelvis. Vascular stent noted. Atherosclerotic calcifications are noted.    Medications:  dextrose 5% + lactated ringers. 1000 milliLiter(s) IV Continuous <Continuous>  diVALproex Sprinkle 125 milliGRAM(s) Oral at bedtime  enalaprilat Injectable 1.25 milliGRAM(s) IV Push every 6 hours  ertapenem  IVPB      ertapenem  IVPB 500 milliGRAM(s) IV Intermittent every 24 hours  hydrALAZINE Injectable 10 milliGRAM(s) IV Push every 8 hours PRN  LORazepam     Tablet 0.5 milliGRAM(s) Oral two times a day  memantine 5 milliGRAM(s) Oral two times a day  multivitamin 1 Tablet(s) Oral daily  rivaroxaban 10 milliGRAM(s) Oral daily  valproate sodium  IVPB 125 milliGRAM(s) IV Intermittent every 8 hours PRN    Antimicrobials:  ertapenem  IVPB      ertapenem  IVPB 500 milliGRAM(s) IV Intermittent every 24 hours

## 2022-06-29 NOTE — PROGRESS NOTE ADULT - SUBJECTIVE AND OBJECTIVE BOX
[facility], Division of Infectious Diseases  SEEMA Connor Y. Patel, S. Shah, G. Casimir  [phone]  ([phone] - weekdays after 5pm and weekends)    Name: NIYA FINNEGAN  Age/Gender: 89y Female  MRN: [id number]    Interval History:  Patient seen and examined this morning.   Resting comfortably, confused, unable to obtain ROS.   Notes reviewed. No concerning overnight events  Afebrile     Allergies: penicillin (Unknown)  sulfa drugs (Unknown)    Objective:  Vitals:   T(F): 98.2 (06-29-22 @ 05:00), Max: 98.7 (06-28-22 @ 15:26)  HR: 94 (06-29-22 @ 05:00) (76 - 105)  BP: 148/70 (06-29-22 @ 05:00) (116/69 - 173/94)  RR: 17 (06-29-22 @ 05:00) (17 - 18)  SpO2: 95% (06-29-22 @ 05:00) (94% - 96%)  Physical Examination:  General: no acute distress  HEENT: NC, L facial bruising, anicteric, neck supple  Respiratory: clear to auscultation bilaterally  Cardiovascular: S1 and S2 present, normal rate   Gastrointestinal: soft, nontender, nondistended  Neuro: AAOx0, confused  Extremities: no edema, no cyanosis  Skin: no visible rash    Laboratory Studies:  CBC:                       8.6    11.49 )-----------( 270      ( 29 Jun 2022 10:05 )             26.7     WBC Trend:  11.49 06-29-22 @ 10:05  11.78 06-28-22 @ 11:05  16.34 06-27-22 @ 04:20    CMP: 06-28    145  |  112<H>  |  37<H>  ----------------------------<  107<H>  4.4   |  22  |  1.62<H>    Ca    9.3      28 Jun 2022 11:05    Creatinine, Serum: 1.62 mg/dL (06-28-22 @ 11:05)  Creatinine, Serum: 2.09 mg/dL (06-27-22 @ 04:18)    Microbiology: reviewed   Culture - Urine (collected 06-27-22 @ 09:43)  Source: Catheterized Catheterized  Final Report (06-29-22 @ 08:19):    >100,000 CFU/ml Klebsiella pneumoniae ESBL  Organism: Klebsiella pneumoniae ESBL (06-29-22 @ 08:19)  Organism: Klebsiella pneumoniae ESBL (06-29-22 @ 08:19)      -  Amikacin: S <=16      -  Amoxicillin/Clavulanic Acid: R >16/8      -  Ampicillin: R >16 These ampicillin results predict results for amoxicillin      -  Ampicillin/Sulbactam: R >16/8 Enterobacter, Klebsiella aerogenes, Citrobacter, and Serratia may develop resistance during prolonged therapy (3-4 days)      -  Aztreonam: R >16      -  Cefazolin: R >16 (MIC_CL_COM_ENTERIC_CEFAZU) For uncomplicated UTI with K. pneumoniae, E. coli, or P. mirablis: DIANA <=16 is sensitive and DIANA >=32 is resistant. This also predicts results for oral agents cefaclor, cefdinir, cefpodoxime, cefprozil, cefuroxime axetil, cephalexin and locarbef for uncomplicated UTI. Note that some isolates may be susceptible to these agents while testing resistant to cefazolin.      -  Cefepime: R >16      -  Ceftriaxone: R >32 Enterobacter, Klebsiella aerogenes, Citrobacter, and Serratia may develop resistance during prolonged therapy      -  Ciprofloxacin: R 2      -  Ertapenem: S <=0.5      -  Gentamicin: R >8      -  Imipenem: S <=1      -  Levofloxacin: S <=0.5      -  Meropenem: S <=1      -  Nitrofurantoin: I 64 Should not be used to treat pyelonephritis      -  Piperacillin/Tazobactam: S <=8      -  Tigecycline: S <=2      -  Tobramycin: R >8      -  Trimethoprim/Sulfamethoxazole: R >2/38      Method Type: DIANA    Radiology: reviewed     Medications:  dextrose 5% + lactated ringers. 1000 milliLiter(s) IV Continuous <Continuous>  diVALproex Sprinkle 125 milliGRAM(s) Oral at bedtime  enalaprilat Injectable 1.25 milliGRAM(s) IV Push every 6 hours  ertapenem  IVPB      ertapenem  IVPB 500 milliGRAM(s) IV Intermittent every 24 hours  hydrALAZINE Injectable 10 milliGRAM(s) IV Push every 8 hours PRN  LORazepam     Tablet 0.5 milliGRAM(s) Oral two times a day  memantine 5 milliGRAM(s) Oral two times a day  multivitamin 1 Tablet(s) Oral daily  rivaroxaban 10 milliGRAM(s) Oral daily  valproate sodium  IVPB 125 milliGRAM(s) IV Intermittent every 8 hours PRN    Antimicrobials:  ertapenem  IVPB      ertapenem  IVPB 500 milliGRAM(s) IV Intermittent every 24 hours

## 2022-06-29 NOTE — PROGRESS NOTE ADULT - ASSESSMENT
Patient is a 89 year old female with PMH HTN, DM (not on meds), dementia, DVT on eliquis BIBEMS after an unwitnessed fall at home.    Acute cystitis/UTI  with ESBL Klebsiella   Unwitnessed fall at home  L scalp laceration s/p repair  Leukocytosis likely d/t to above   H/o advanced dementia   H/o PCN and sulfa allergies - unknown reactions    - afebrile, laceration repaired in ER, WBC trended down/stable     - imaging reviewed, has L scalp hematoma, no collections/abscess, no pna   - Neurology following    - UA with pyuria - 48 WBC, large LE, many bacteria and +nitrites   - urine culture with ESBL Klebsiella, sensitivities noted    - continue on ertapenem 500mg IV Q24h - complete total 5d course - end 7/1   - monitor temps/WBC   - pain management per primary team   - aspiration precautions       Nataly Elizabeth M.D.  [facility], Division of Infectious Diseases  [phone]  After 5pm on weekdays and all day on weekends - please call [phone]

## 2022-06-29 NOTE — PROGRESS NOTE ADULT - SUBJECTIVE AND OBJECTIVE BOX
Patient is a 89y old  Female who presents with a chief complaint of metabolic encephalopathy (29 Jun 2022 10:18)      SUBJECTIVE / OVERNIGHT EVENTS: MS is back at baseline, having better po intake    MEDICATIONS  (STANDING):  dextrose 5% + lactated ringers. 1000 milliLiter(s) (75 mL/Hr) IV Continuous <Continuous>  enalaprilat Injectable 1.25 milliGRAM(s) IV Push every 6 hours  ertapenem  IVPB      ertapenem  IVPB 500 milliGRAM(s) IV Intermittent every 24 hours  memantine 5 milliGRAM(s) Oral two times a day  multivitamin 1 Tablet(s) Oral daily  rivaroxaban 10 milliGRAM(s) Oral daily  traZODone 100 milliGRAM(s) Oral at bedtime    MEDICATIONS  (PRN):  hydrALAZINE Injectable 10 milliGRAM(s) IV Push every 8 hours PRN SBP above 150  LORazepam     Tablet 0.5 milliGRAM(s) Oral two times a day PRN Agitation  valproate sodium  IVPB 125 milliGRAM(s) IV Intermittent every 8 hours PRN agitation      Vital Signs Last 24 Hrs  T(F): 98.4 (06-29-22 @ 11:49), Max: 98.6 (06-28-22 @ 22:15)  HR: 95 (06-29-22 @ 11:49) (76 - 98)  BP: 157/71 (06-29-22 @ 11:49) (116/69 - 170/98)  RR: 17 (06-29-22 @ 11:49) (17 - 18)  SpO2: 99% (06-29-22 @ 11:49) (95% - 99%)  Telemetry:   CAPILLARY BLOOD GLUCOSE        I&O's Summary    28 Jun 2022 07:01  -  29 Jun 2022 07:00  --------------------------------------------------------  IN: 1275 mL / OUT: 0 mL / NET: 1275 mL    29 Jun 2022 07:01  -  29 Jun 2022 18:53  --------------------------------------------------------  IN: 50 mL / OUT: 0 mL / NET: 50 mL        PHYSICAL EXAM:  GENERAL: NAD, well-developed  HEAD:  Atraumatic, Normocephalic  EYES: EOMI, PERRLA, conjunctiva and sclera clear  NECK: Supple, No JVD  CHEST/LUNG: Clear to auscultation bilaterally; No wheeze  HEART: Regular rate and rhythm; No murmurs, rubs, or gallops  ABDOMEN: Soft, Nontender, Nondistended; Bowel sounds present  EXTREMITIES:  2+ Peripheral Pulses, No clubbing, cyanosis, or edema  PSYCH: AAOx3  NEUROLOGY: non-focal  SKIN: No rashes or lesions    LABS:                        8.6    11.49 )-----------( 270      ( 29 Jun 2022 10:05 )             26.7     06-29    147<H>  |  114<H>  |  29<H>  ----------------------------<  116<H>  4.1   |  25  |  1.48<H>    Ca    9.0      29 Jun 2022 10:05                RADIOLOGY & ADDITIONAL TESTS:    Imaging Personally Reviewed:    Consultant(s) Notes Reviewed:      Care Discussed with Consultants/Other Providers:

## 2022-06-29 NOTE — PROGRESS NOTE ADULT - SUBJECTIVE AND OBJECTIVE BOX
DATE OF SERVICE: 06-29-22 @ 10:06    Patient is a 89y old  Female who presents with a chief complaint of metabolic encephalopathy (28 Jun 2022 23:35)      INTERVAL HISTORY: Patient alert and in no acute distress. Unable to participate in ROS d/t advanced dementia.     REVIEW OF SYSTEMS:  CONSTITUTIONAL: No weakness  EYES/ENT: No visual changes;  No throat pain   NECK: No pain or stiffness  RESPIRATORY: No cough, wheezing; No shortness of breath  CARDIOVASCULAR: No chest pain or palpitations  GASTROINTESTINAL: No abdominal  pain. No nausea, vomiting, or hematemesis  GENITOURINARY: No dysuria, frequency or hematuria  NEUROLOGICAL: No stroke like symptoms  SKIN: No rashes    	  MEDICATIONS:  enalaprilat Injectable 1.25 milliGRAM(s) IV Push every 6 hours  hydrALAZINE Injectable 10 milliGRAM(s) IV Push every 8 hours PRN        PHYSICAL EXAM:  T(C): 36.8 (06-29-22 @ 05:00), Max: 37.1 (06-28-22 @ 15:26)  HR: 94 (06-29-22 @ 05:00) (76 - 105)  BP: 148/70 (06-29-22 @ 05:00) (116/69 - 173/94)  RR: 17 (06-29-22 @ 05:00) (17 - 18)  SpO2: 95% (06-29-22 @ 05:00) (94% - 96%)  Wt(kg): --  I&O's Summary    28 Jun 2022 07:01  -  29 Jun 2022 07:00  --------------------------------------------------------  IN: 1275 mL / OUT: 0 mL / NET: 1275 mL          Appearance: In no distress	  HEENT:    PERRL, EOMI	  Cardiovascular:  S1 S2, No JVD  Respiratory: Lungs clear to auscultation	  Gastrointestinal:  Soft, Non-tender, + BS	  Vascularature:  No edema of LE  Psychiatric: Appropriate affect   Neuro: no acute focal deficits                               9.5    11.78 )-----------( 274      ( 28 Jun 2022 11:05 )             29.4     06-28    145  |  112<H>  |  37<H>  ----------------------------<  107<H>  4.4   |  22  |  1.62<H>    Ca    9.3      28 Jun 2022 11:05          Labs personally reviewed      ASSESSMENT/PLAN: 	      Ms. Meghna Claire is a 89 yo female with advanced dementia who lives at home with 24 hour care who presents to [facility] after fall at home. Found to have UTI.    Problem/Plan -1  Problem: Fall/Syncope  - Likely precipitated by UTI in elderly lady  - EKG with mild prolonged QTc on antipsychotics  - Repeat EKG  - CT head negative for hemorrhage or infarct  - IV ABx as per primary team  - Neurology following    Problem/Plan -2  Problem: Elevated Troponin  - Trop flat 106 --> 102  - EKG with no acute ischemic characteristics  - Likely demand secondary to acute infection    Problem/Plan -3  Problem: HTN  - c/w IV enalapril with hold parameters        Surekha Chacko, AG-NP   Manuel Ibarra DO [facility]  Cardiovascular Medicine  [address] [place] [address], [address]  Office: [phone]  Cell: [phone]

## 2022-06-29 NOTE — PROGRESS NOTE ADULT - SUBJECTIVE AND OBJECTIVE BOX
[facility] Neurological Care [facility]      Seen earlier today, and examined.  - Today, patient is without complaints.           *****MEDICATIONS: Current medication reviewed and documented.    MEDICATIONS  (STANDING):  amLODIPine   Tablet 5 milliGRAM(s) Oral daily  dextrose 5% + lactated ringers. 1000 milliLiter(s) (75 mL/Hr) IV Continuous <Continuous>  ertapenem  IVPB      ertapenem  IVPB 500 milliGRAM(s) IV Intermittent every 24 hours  losartan 25 milliGRAM(s) Oral daily  memantine 5 milliGRAM(s) Oral two times a day  multivitamin 1 Tablet(s) Oral daily  rivaroxaban 10 milliGRAM(s) Oral daily  traZODone 100 milliGRAM(s) Oral at bedtime    MEDICATIONS  (PRN):  hydrALAZINE Injectable 10 milliGRAM(s) IV Push every 8 hours PRN SBP above 150  LORazepam     Tablet 0.5 milliGRAM(s) Oral two times a day PRN Agitation  valproate sodium  IVPB 125 milliGRAM(s) IV Intermittent every 8 hours PRN agitation          ***** VITAL SIGNS:  T(F): 98.4 (22 @ 11:49), Max: 98.6 (22 @ 22:15)  HR: 95 (22 @ 11:49) (76 - 98)  BP: 157/71 (22 @ 11:49) (116/69 - 170/98)  RR: 17 (22 @ 11:49) (17 - 18)  SpO2: 99% (22 @ 11:49) (95% - 99%)  Wt(kg): --  ,   I&O's Summary    2022 07: 07:00  --------------------------------------------------------  IN: 1275 mL / OUT: 0 mL / NET: 1275 mL    2022 07:  -  2022 21:59  --------------------------------------------------------  IN: 50 mL / OUT: 0 mL / NET: 50 mL             *****PHYSICAL EXAM:  sleeping   speech is clearer today      alert somewhat agitated   not following any commands   Gait not assessed.            *****LAB AND IMAGIN.6    11.49 )-----------( 270      ( 2022 10:05 )             26.7               06-29    147<H>  |  114<H>  |  29<H>  ----------------------------<  116<H>  4.1   |  25  |  1.48<H>    Ca    9.0      2022 10:05                           [All pertinent recent Imaging/Reports reviewed]           *****A S S E S S M E N T   A N D   P L A N :        89 year old F with PMH HTN, DM (not on meds), dementia, DVT on eliquis BIBEMS after an unwitnessed fall at home. Aide heard her fall. Saw her on ground. Said she was lethargic immediately after. Patient arrived in C-collar. Laceration above R eyebrow. Cannot provide further history    aide at bedside reports that she has advanced dementia 24 hr aide   dependent of all adls  impulsive   ambulates with walker and help of aide   does sundown as per aide     Problem/Recommendations 1:  fall without any sig fracture  noted left scalp hematoma   will continue to monitor      Problem/Recommendations 2: ams   likely multifactorial, related to uti, fall worsening underlying dementia  currently on abx   ua +   urine culture pending   dc aricept due to worsening agitation   depakote prn for agitation/sundowning   will taper down ativan due to increased risk of falls.       sleep wake cycle regulation     Thank you for allowing me to participate in the care of this patient. Will continue to follow patient periodically. Please do not hesitate to call me if you have any  questions or if there has been a change in patients neurological status     ________________  Urszula Funez MD  [facility] Neurological [facility]  [phone]      33 minutes spent on total encounter; more than 50 % of the visit was  spent counseling about plan of care, compliance to diet/exercise and medication regimen and or  coordinating care by the attending physician.      It is advised that stroke patients follow up with JAXON Hayward @ [phone] in 1- 2 weeks.   Others please follow up with Dr. Michael Nissenbaum [phone]

## 2022-06-29 NOTE — PROVIDER CONTACT NOTE (OTHER) - ASSESSMENT
pt Alertcxconfused ,asymptomatic
AOx1. No complaints of SOB, dizziness or chest pain. Due for IV BP meds.

## 2022-06-29 NOTE — PROVIDER CONTACT NOTE (OTHER) - ACTION/TREATMENT ORDERED:
NP aware. Recheck in 1 hour, if still elevated give PRN apresoline IV.
pt can have hydralazine 10mg ivp as per PRN order

## 2022-06-29 NOTE — PROGRESS NOTE ADULT - ASSESSMENT
Patient is a 89 year old female with PMH HTN, DM (not on meds), dementia, DVT on eliquis BIBEMS after an unwitnessed fall at home.  Ptn is altered, has  Acute cystits/UTI  L scalp laceration repaired. Tdap given  Unwitnessed fall at home  Leukocytosis likely d/t to above   H/o advanced dementia   H/o PCN and sulfa allergies - unknown reactions    - afebrile in ER, laceration repaired in ER, WBC noted    - UA with pyuria - growing ESBL Klebsielaa, on INVANZ through 7/1   - imaging reviewed, has L scalp hematoma, no collections/abscess, no pna   - Neurology and ID following    - follow blood and urine cultures   - daughter wants valproate at hs DCed  - will cont  Namenda 5 mg bid  - cont outptn BP meds  - pr interval is prolonged  - thiamine iv x 1 given, cont daily  MVI  - IVF   - aspiration precautions   - dvt ppx w po Xarelto  - speech and swallow eval done, on dysphagia 2 diet

## 2022-06-30 NOTE — GOALS OF CARE CONVERSATION - ADVANCED CARE PLANNING - PARTICIPANTS
[address] [facility] [address] [facility] 
[phone] Patient: Sanna Grewal 
MRN: [id number] Visit Information Date & Time Provider Department Dept. Phone Encounter #  
 2/13/2018  2:00 PM Pearl Stewart MD [facility] [phone] [id number] Follow-up Instructions Return in about 3 months (around 5/13/2018). Your Appointments 2/14/2018  1:00 PM  
ESTABLISHED PATIENT with Silvestre Garcia MD  
[facility] ([facility]) Appt Note: 1 mo fup  
 [address] [facility] [address] [facility]  
[address] [address] [address] Upcoming Health Maintenance Date Due DTaP/Tdap/Td series (1 - Tdap) 2/12/1963 ZOSTER VACCINE AGE 60> 12/12/2001 GLAUCOMA SCREENING Q2Y 2/12/2007 OSTEOPOROSIS SCREENING (DEXA) 2/12/2007 Pneumococcal 65+ Low/Medium Risk (1 of 2 - PCV13) 2/12/2007 MEDICARE YEARLY EXAM 2/12/2007 Influenza Age 5 to Adult 8/1/2017 Allergies as of 2/13/2018  Review Complete On: 2/13/2018 By: Lay Brown LPN Severity Noted Reaction Type Reactions Citalopram Hydrobromide High 09/16/2014    Rash With itching. Chlorthalidone  04/03/2013    Rash Maxzide [Triamterene-hydrochlorothiazid]  04/03/2013    Palpitations Vicodin [Hydrocodone-acetaminophen]  04/03/2013    Palpitations Current Immunizations  Reviewed on 3/3/2012 No immunizations on file. Not reviewed this visit You Were Diagnosed With   
  
 Codes Comments LAE (left atrial enlargement)    -  Primary ICD-10-CM: I51.7 ICD-9-CM: 429.3 RBBB     ICD-10-CM: I45.10 ICD-9-CM: 426.4 HTN (hypertension), benign     ICD-10-CM: I10 
ICD-9-CM: 401.1 Paroxysmal atrial fibrillation (HCC)     ICD-10-CM: I48.0 ICD-9-CM: 427.31   
 SVT (supraventricular tachycardia) (HCC)     ICD-10-CM: I47.1 ICD-9-CM: 427.89 S/P ablation of atrial fibrillation     ICD-10-CM: Z98.890, Z86.79 
ICD-9-CM: V45.89 Venous insufficiency     ICD-10-CM: I87.2 ICD-9-CM: 459.81 Vitals BP Pulse Resp Height(growth percentile) Weight(growth percentile) SpO2  
 152/84 (BP 1 Location: Left arm, BP Patient Position: Sitting) 96 16 5' 3\" (1.6 m) 192 lb (87.1 kg) 98% BMI OB Status Smoking Status 34.01 kg/m2 Postmenopausal Former Smoker BMI and BSA Data Body Mass Index Body Surface Area 34.01 kg/m 2 1.97 m 2 Preferred Pharmacy Pharmacy Name Phone [place], [address] [facility] Felicity Ureña [phone] Your Updated Medication List  
  
   
This list is accurate as of: 2/13/18  2:37 PM.  Always use your most recent med list.  
  
  
  
  
 CALTRATE 600 + D PO Take  by mouth daily. carvedilol 6.25 mg tablet Commonly known as:  COREG  
take 1 tablet by mouth twice a day with food CENTRUM SILVER PO Take  by mouth daily. cloNIDine HCl 0.1 mg tablet Commonly known as:  CATAPRES Take 1 Tab by mouth three (3) times daily. Hold for SBP <120  Indications: hypertension * DILTIAZEM HCL PO Take 120 mg by mouth. * dilTIAZem  mg ER capsule Commonly known as:  CARDIZEM CD Take 1 Cap by mouth daily. ELIQUIS 5 mg tablet Generic drug:  apixaban  
take 1 tablet by mouth twice a day  
  
 furosemide 20 mg tablet Commonly known as:  LASIX  
take 1 tablet by mouth PRN  
  
 levothyroxine 50 mcg tablet Commonly known as:  SYNTHROID  
take 1 tablet by mouth once daily PREVACID 30 mg capsule Generic drug:  lansoprazole Take 30 mg by mouth nightly. ZETIA 10 mg tablet Generic drug:  ezetimibe Take 10 mg by mouth every evening. * Notice:   This list has 2 medication(s) that are the same as other medications prescribed for you. Read the directions carefully, and ask your doctor or other care provider to review them with you. Follow-up Instructions Return in about 3 months (around 5/13/2018). Patient Instructions Start Lexapro as advised by Dr. Anatoly Melton for at least 3 months. Introducing \[facility]\"" & [facility] SERVICES! Dear Dylon Clemens: Thank you for requesting a [employer] account. Our records indicate that you already have an active [employer] account. You can access your account anytime at [email or web address] Did you know that you can access your hospital and ER discharge instructions at any time in [employer]? You can also review all of your test results from your hospital stay or ER visit. Additional Information If you have questions, please visit the Frequently Asked Questions section of the [employer] website at [email or web address] Remember, [employer] is NOT to be used for urgent needs. For medical emergencies, dial 911. Now available from your iPhone and Android! Please provide this summary of care documentation to your next provider. Your primary care clinician is listed as Benito Isaac. If you have any questions after today's visit, please call [phone]. Family

## 2022-06-30 NOTE — PROGRESS NOTE ADULT - ASSESSMENT
Patient is a 89 year old female with PMH HTN, DM (not on meds), dementia, DVT on eliquis BIBEMS after an unwitnessed fall at home.  Ptn is altered, has  Acute cystits/UTI  L scalp laceration repaired. Tdap given  Unwitnessed fall at home  Leukocytosis likely d/t to above   H/o advanced dementia   H/o PCN and sulfa allergies - unknown reactions    - afebrile in ER, laceration repaired in ER, WBC noted    - UA with pyuria - growing ESBL Klebsielaa, on INVANZ through 7/1   - imaging reviewed, has L scalp hematoma, no collections/abscess, no pna   - Neurology and ID following    - follow blood and urine cultures   - daughter wants valproate at hs DCed  - will cont  Namenda 5 mg bid  - cont outptn BP meds  - pr interval is prolonged  - thiamine iv x 1 given, cont daily  MVI  - IVF  - GORDO resolved   - aspiration precautions   - dvt ppx w po Xarelto  - speech and swallow eval done, on dysphagia 2 diet  - DC home on 7/1

## 2022-06-30 NOTE — PROGRESS NOTE ADULT - ASSESSMENT
Patient is a 89 year old female with PMH HTN, DM (not on meds), dementia, DVT on eliquis BIBEMS after an unwitnessed fall at home.    Acute cystitis/UTI  with ESBL Klebsiella   Unwitnessed fall at home  L scalp laceration s/p repair  Leukocytosis likely d/t to above   H/o advanced dementia   H/o PCN and sulfa allergies - unknown reactions    - afebrile, laceration repaired in ER, WBC trended down/stable     - imaging reviewed, has L scalp hematoma, no collections/abscess, no pna   - Neurology following    - UA with pyuria - 48 WBC, large LE, many bacteria and +nitrites   - urine culture with ESBL Klebsiella, sensitivities noted    - continue ertapenem 500mg IV Q24h - complete total 5d course - end tomorrow 7/1   - monitor temps/WBC   - pain management per primary team   - aspiration precautions       Nataly Elizabeth M.D.  [facility], Division of Infectious Diseases  [phone]  After 5pm on weekdays and all day on weekends - please call [phone]

## 2022-06-30 NOTE — PROGRESS NOTE ADULT - SUBJECTIVE AND OBJECTIVE BOX
[facility]      Seen earlier today, and examined.  - Today, patient is without complaints.           *****MEDICATIONS: Current medication reviewed and documented.    MEDICATIONS  (STANDING):  dextrose 5% + lactated ringers. 1000 milliLiter(s) (75 mL/Hr) IV Continuous <Continuous>  ertapenem  IVPB      ertapenem  IVPB 500 milliGRAM(s) IV Intermittent every 24 hours  losartan 50 milliGRAM(s) Oral daily  memantine 5 milliGRAM(s) Oral two times a day  multivitamin 1 Tablet(s) Oral daily  NIFEdipine XL 60 milliGRAM(s) Oral daily  rivaroxaban 10 milliGRAM(s) Oral daily  traZODone 100 milliGRAM(s) Oral at bedtime    MEDICATIONS  (PRN):  hydrALAZINE Injectable 10 milliGRAM(s) IV Push every 8 hours PRN SBP above 150  LORazepam     Tablet 0.5 milliGRAM(s) Oral two times a day PRN Agitation  valproate sodium  IVPB 125 milliGRAM(s) IV Intermittent every 8 hours PRN agitation          ***** VITAL SIGNS:  T(F): 97.8 (22 @ 11:55), Max: 98 (22 @ 05:35)  HR: 98 (22 @ 11:55) (98 - 100)  BP: 148/79 (22 @ 11:55) (148/79 - 189/78)  RR: 18 (22 @ 11:55) (17 - 18)  SpO2: 97% (22 @ 11:55) (96% - 97%)  Wt(kg): --  ,   I&O's Summary    2022 07:01  -  2022 07:00  --------------------------------------------------------  IN: 950 mL / OUT: 0 mL / NET: 950 mL             *****PHYSICAL EXAM: sleeping   speech is clearer today      alert somewhat agitated   not following any commands   Gait not assessed.        *****LAB AND IMAGIN.6    11.49 )-----------( 270      ( 2022 10:05 )             26.7               06-    147<H>  |  114<H>  |  29<H>  ----------------------------<  116<H>  4.1   |  25  |  1.48<H>    Ca    9.0      2022 10:05                           [All pertinent recent Imaging/Reports reviewed]           *****A S S E S S M E N T   A N D   P L A N :      89 year old F with PMH HTN, DM (not on meds), dementia, DVT on eliquis BIBEMS after an unwitnessed fall at home. Aide heard her fall. Saw her on ground. Said she was lethargic immediately after. Patient arrived in C-collar. Laceration above R eyebrow. Cannot provide further history    aide at bedside reports that she has advanced dementia 24 hr aide   dependent of all adls  impulsive   ambulates with walker and help of aide   does sundown as per aide     Problem/Recommendations 1:  fall without any sig fracture  noted left scalp hematoma   will continue to monitor      Problem/Recommendations 2: ams   likely multifactorial, related to uti, fall worsening underlying dementia  currently on abx   ua +   urine culture pending   dc aricept due to worsening agitation   depakote prn for agitation/sundowning   will taper down ativan due to increased risk of falls.       sleep wake cycle regulation       Thank you for allowing me to participate in the care of this patient. Will continue to follow patient periodically. Please do not hesitate to call me if you have any  questions or if there has been a change in patients neurological status     ________________  Urszula Funez MD  [facility] ([facility]  [phone]      33 minutes spent on total encounter; more than 50 % of the visit was  spent counseling about plan of care, compliance to diet/exercise and medication regimen and or  coordinating care by the attending physician.      It is advised that stroke patients follow up with JAXON Hayward @ [phone] in 1- 2 weeks.   Others please follow up with Dr. Michael Nissenbaum [phone]

## 2022-06-30 NOTE — PROGRESS NOTE ADULT - SUBJECTIVE AND OBJECTIVE BOX
[facility], Division of Infectious Diseases  SEEMA Connor Y. Patel, S. Shah, G. Casimir  [phone]  ([phone] - weekdays after 5pm and weekends)    Name: NIYA FINNEGAN  Age/Gender: 89y Female  MRN: [id number]    Interval History:  Patient seen and examined this morning.   Patient awake, confused, denies pain.   Notes reviewed. Afebrile   Allergies: penicillin (Unknown)  sulfa drugs (Unknown)      Objective:  Vitals:   T(F): 97.8 (06-30-22 @ 11:55), Max: 98 (06-30-22 @ 05:35)  HR: 98 (06-30-22 @ 11:55) (98 - 100)  BP: 148/79 (06-30-22 @ 11:55) (148/79 - 189/78)  RR: 18 (06-30-22 @ 11:55) (17 - 18)  SpO2: 97% (06-30-22 @ 11:55) (96% - 97%)  Physical Examination:  General: no acute distress  HEENT: NC, L facial bruising, anicteric, neck supple  Respiratory: clear to auscultation bilaterally  Cardiovascular: S1 and S2 present, normal rate   Gastrointestinal: soft, nontender, nondistended  Neuro: AAOx0, confused  Extremities: no edema, no cyanosis  Skin: no visible rash    Laboratory Studies:  CBC:                       8.6    11.49 )-----------( 270      ( 29 Jun 2022 10:05 )             26.7     WBC Trend:  11.49 06-29-22 @ 10:05  11.78 06-28-22 @ 11:05  16.34 06-27-22 @ 04:20    CMP: 06-29    147<H>  |  114<H>  |  29<H>  ----------------------------<  116<H>  4.1   |  25  |  1.48<H>    Ca    9.0      29 Jun 2022 10:05      Creatinine, Serum: 1.48 mg/dL (06-29-22 @ 10:05)  Creatinine, Serum: 1.62 mg/dL (06-28-22 @ 11:05)  Creatinine, Serum: 2.09 mg/dL (06-27-22 @ 04:18)    Microbiology: reviewed   Culture - Urine (collected 06-27-22 @ 09:43)  Source: Catheterized Catheterized  Final Report (06-29-22 @ 08:19):    >100,000 CFU/ml Klebsiella pneumoniae ESBL  Organism: Klebsiella pneumoniae ESBL (06-29-22 @ 08:19)  Organism: Klebsiella pneumoniae ESBL (06-29-22 @ 08:19)      -  Amikacin: S <=16      -  Amoxicillin/Clavulanic Acid: R >16/8      -  Ampicillin: R >16 These ampicillin results predict results for amoxicillin      -  Ampicillin/Sulbactam: R >16/8 Enterobacter, Klebsiella aerogenes, Citrobacter, and Serratia may develop resistance during prolonged therapy (3-4 days)      -  Aztreonam: R >16      -  Cefazolin: R >16 (MIC_CL_[id number]) For uncomplicated UTI with K. pneumoniae, E. coli, or P. mirablis: DIANA <=16 is sensitive and DIANA >=32 is resistant. This also predicts results for oral agents cefaclor, cefdinir, cefpodoxime, cefprozil, cefuroxime axetil, cephalexin and locarbef for uncomplicated UTI. Note that some isolates may be susceptible to these agents while testing resistant to cefazolin.      -  Cefepime: R >16      -  Ceftriaxone: R >32 Enterobacter, Klebsiella aerogenes, Citrobacter, and Serratia may develop resistance during prolonged therapy      -  Ciprofloxacin: R 2      -  Ertapenem: S <=0.5      -  Gentamicin: R >8      -  Imipenem: S <=1      -  Levofloxacin: S <=0.5      -  Meropenem: S <=1      -  Nitrofurantoin: I 64 Should not be used to treat pyelonephritis      -  Piperacillin/Tazobactam: S <=8      -  Tigecycline: S <=2      -  Tobramycin: R >8      -  Trimethoprim/Sulfamethoxazole: R >2/38      Method Type: DIANA    Radiology: reviewed     Medications:  dextrose 5% + lactated ringers. 1000 milliLiter(s) IV Continuous <Continuous>  ertapenem  IVPB      ertapenem  IVPB 500 milliGRAM(s) IV Intermittent every 24 hours  hydrALAZINE Injectable 10 milliGRAM(s) IV Push every 8 hours PRN  LORazepam     Tablet 0.5 milliGRAM(s) Oral two times a day PRN  losartan 50 milliGRAM(s) Oral daily  memantine 5 milliGRAM(s) Oral two times a day  multivitamin 1 Tablet(s) Oral daily  NIFEdipine XL 60 milliGRAM(s) Oral daily  rivaroxaban 10 milliGRAM(s) Oral daily  traZODone 100 milliGRAM(s) Oral at bedtime  valproate sodium  IVPB 125 milliGRAM(s) IV Intermittent every 8 hours PRN    Antimicrobials:  ertapenem  IVPB      ertapenem  IVPB 500 milliGRAM(s) IV Intermittent every 24 hours

## 2022-06-30 NOTE — GOALS OF CARE CONVERSATION - ADVANCED CARE PLANNING - CONVERSATION DETAILS
Due to capacity spoke with daughter Mary. Introduced self and role of the PCE.  No documents found on patient window search.  Daughter reports her other 2 siblings are more of the executers of the her mother.  All children have come together and discussed mothers care.  Pt purchased a garden apartment and resided in it with 24/7 care. Mary lives near her mothers apartment.  Mothers mother also lives into her late 90s.  Family is aware of dementia and course and prognosis and care needs for the future.  Reviewed UTI prevention.      CPR/ intubation procedures and possible complications explained.  Daughter reports she has provided the aids with a form with expressed wishes to present to the hospital in the even of an emergency.  Discussed MOLST form and while it sounds familiar will have to locate documents for medial records.  She will discuss with her brother.  They do not want pt to be resuscitated.     Contact information for further needs provided.  No Mu-ism exemptions noted, pt used to pray the rosary but is unable to resite prayer. Code GHOP provided.  caregiver center information provided.       Kristen Sung RN  Palliative Care Educator  [phone] cell

## 2022-06-30 NOTE — PHYSICAL THERAPY INITIAL EVALUATION ADULT - PERTINENT HX OF CURRENT PROBLEM, REHAB EVAL
89 year old F with PMH HTN, DM (not on meds), dementia, DVT on eliquis BIBEMS after an unwitnessed fall at home. Aide heard her fall. Saw her on ground. Said she was lethargic immediately after. Laceration above R eyebrow repaired in the ED. Imaging negative for fractures

## 2022-06-30 NOTE — PROGRESS NOTE ADULT - SUBJECTIVE AND OBJECTIVE BOX
Patient is a 89y old  Female who presents with a chief complaint of metabolic encephalopathy (30 Jun 2022 12:07)      SUBJECTIVE / OVERNIGHT EVENTS:    MEDICATIONS  (STANDING):  dextrose 5% + lactated ringers. 1000 milliLiter(s) (75 mL/Hr) IV Continuous <Continuous>  ertapenem  IVPB      ertapenem  IVPB 500 milliGRAM(s) IV Intermittent every 24 hours  losartan 50 milliGRAM(s) Oral daily  memantine 5 milliGRAM(s) Oral two times a day  multivitamin 1 Tablet(s) Oral daily  NIFEdipine XL 60 milliGRAM(s) Oral daily  rivaroxaban 10 milliGRAM(s) Oral daily  traZODone 100 milliGRAM(s) Oral at bedtime    MEDICATIONS  (PRN):  hydrALAZINE Injectable 10 milliGRAM(s) IV Push every 8 hours PRN SBP above 170  LORazepam     Tablet 0.5 milliGRAM(s) Oral two times a day PRN Agitation  valproate sodium  IVPB 125 milliGRAM(s) IV Intermittent every 8 hours PRN agitation      Vital Signs Last 24 Hrs  T(F): 97.8 (06-30-22 @ 11:55), Max: 98 (06-30-22 @ 05:35)  HR: 60 (06-30-22 @ 17:28) (60 - 100)  BP: 176/77 (06-30-22 @ 17:28) (148/79 - 189/78)  RR: 18 (06-30-22 @ 11:55) (17 - 18)  SpO2: 97% (06-30-22 @ 11:55) (96% - 97%)  Telemetry:   CAPILLARY BLOOD GLUCOSE        I&O's Summary    29 Jun 2022 07:01  -  30 Jun 2022 07:00  --------------------------------------------------------  IN: 950 mL / OUT: 0 mL / NET: 950 mL    30 Jun 2022 07:01  -  30 Jun 2022 20:46  --------------------------------------------------------  IN: 240 mL / OUT: 0 mL / NET: 240 mL        PHYSICAL EXAM:  GENERAL: NAD, well-developed  HEAD:  Atraumatic, Normocephalic  EYES: EOMI, PERRLA, conjunctiva and sclera clear  NECK: Supple, No JVD  CHEST/LUNG: Clear to auscultation bilaterally; No wheeze  HEART: Regular rate and rhythm; No murmurs, rubs, or gallops  ABDOMEN: Soft, Nontender, Nondistended; Bowel sounds present  EXTREMITIES:  2+ Peripheral Pulses, No clubbing, cyanosis, or edema  PSYCH: AAOx3  NEUROLOGY: non-focal  SKIN: No rashes or lesions    LABS:                        8.6    11.49 )-----------( 270      ( 29 Jun 2022 10:05 )             26.7     06-29    147<H>  |  114<H>  |  29<H>  ----------------------------<  116<H>  4.1   |  25  |  1.48<H>    Ca    9.0      29 Jun 2022 10:05                RADIOLOGY & ADDITIONAL TESTS:    Imaging Personally Reviewed:    Consultant(s) Notes Reviewed:      Care Discussed with Consultants/Other Providers:   Patient is a 89y old  Female who presents with a chief complaint of metabolic encephalopathy (30 Jun 2022 12:07)      SUBJECTIVE / OVERNIGHT EVENTS: MS at baseline    MEDICATIONS  (STANDING):  dextrose 5% + lactated ringers. 1000 milliLiter(s) (75 mL/Hr) IV Continuous <Continuous>  ertapenem  IVPB      ertapenem  IVPB 500 milliGRAM(s) IV Intermittent every 24 hours  losartan 50 milliGRAM(s) Oral daily  memantine 5 milliGRAM(s) Oral two times a day  multivitamin 1 Tablet(s) Oral daily  NIFEdipine XL 60 milliGRAM(s) Oral daily  rivaroxaban 10 milliGRAM(s) Oral daily  traZODone 100 milliGRAM(s) Oral at bedtime    MEDICATIONS  (PRN):  hydrALAZINE Injectable 10 milliGRAM(s) IV Push every 8 hours PRN SBP above 170  LORazepam     Tablet 0.5 milliGRAM(s) Oral two times a day PRN Agitation  valproate sodium  IVPB 125 milliGRAM(s) IV Intermittent every 8 hours PRN agitation      Vital Signs Last 24 Hrs  T(F): 97.8 (06-30-22 @ 11:55), Max: 98 (06-30-22 @ 05:35)  HR: 60 (06-30-22 @ 17:28) (60 - 100)  BP: 176/77 (06-30-22 @ 17:28) (148/79 - 189/78)  RR: 18 (06-30-22 @ 11:55) (17 - 18)  SpO2: 97% (06-30-22 @ 11:55) (96% - 97%)  Telemetry:   CAPILLARY BLOOD GLUCOSE        I&O's Summary    29 Jun 2022 07:01  -  30 Jun 2022 07:00  --------------------------------------------------------  IN: 950 mL / OUT: 0 mL / NET: 950 mL    30 Jun 2022 07:01  -  30 Jun 2022 20:46  --------------------------------------------------------  IN: 240 mL / OUT: 0 mL / NET: 240 mL        PHYSICAL EXAM:  GENERAL: NAD, well-developed  HEAD:  Atraumatic, Normocephalic  EYES: EOMI, PERRLA, conjunctiva and sclera clear  NECK: Supple, No JVD  CHEST/LUNG: Clear to auscultation bilaterally; No wheeze  HEART: Regular rate and rhythm; No murmurs, rubs, or gallops  ABDOMEN: Soft, Nontender, Nondistended; Bowel sounds present  EXTREMITIES:  2+ Peripheral Pulses, No clubbing, cyanosis, or edema  PSYCH: AAOx3  NEUROLOGY: non-focal  SKIN: No rashes or lesions    LABS:                        8.6    11.49 )-----------( 270      ( 29 Jun 2022 10:05 )             26.7     06-29    147<H>  |  114<H>  |  29<H>  ----------------------------<  116<H>  4.1   |  25  |  1.48<H>    Ca    9.0      29 Jun 2022 10:05                RADIOLOGY & ADDITIONAL TESTS:    Imaging Personally Reviewed:    Consultant(s) Notes Reviewed:      Care Discussed with Consultants/Other Providers:

## 2022-06-30 NOTE — PHYSICAL THERAPY INITIAL EVALUATION ADULT - ADDITIONAL COMMENTS
Patient is poor historian, however per daughter bedside, patient lives in an apartment on first floor no steps to enter with 24 hour aide. She also states patient walk about 2 blocks everyday with aide

## 2022-06-30 NOTE — PROGRESS NOTE ADULT - SUBJECTIVE AND OBJECTIVE BOX
DATE OF SERVICE: 06-30-22 @ 11:20    Patient is a 89y old  Female who presents with a chief complaint of metabolic encephalopathy (29 Jun 2022 21:58)      INTERVAL HISTORY: Feels ok. Confused at baseline. Daughter Mary at bedside reporting she is much more herself today. Eating well and in no pain.     REVIEW OF SYSTEMS:  CONSTITUTIONAL: No weakness  EYES/ENT: No visual changes;  No throat pain   NECK: No pain or stiffness  RESPIRATORY: No cough, wheezing; No shortness of breath  CARDIOVASCULAR: No chest pain or palpitations  GASTROINTESTINAL: No abdominal  pain. No nausea, vomiting, or hematemesis  GENITOURINARY: No dysuria, frequency or hematuria  NEUROLOGICAL: No stroke like symptoms  SKIN: No rashes    	  MEDICATIONS:  amLODIPine   Tablet 5 milliGRAM(s) Oral daily  hydrALAZINE Injectable 10 milliGRAM(s) IV Push every 8 hours PRN  losartan 25 milliGRAM(s) Oral daily        PHYSICAL EXAM:  T(C): 36.7 (06-30-22 @ 05:35), Max: 36.9 (06-29-22 @ 11:49)  HR: 98 (06-30-22 @ 09:06) (95 - 100)  BP: 189/78 (06-30-22 @ 09:06) (150/79 - 189/78)  RR: 18 (06-30-22 @ 05:35) (17 - 18)  SpO2: 96% (06-30-22 @ 09:06) (96% - 99%)  Wt(kg): --  I&O's Summary    29 Jun 2022 07:01  -  30 Jun 2022 07:00  --------------------------------------------------------  IN: 950 mL / OUT: 0 mL / NET: 950 mL          Appearance: In no distress, laceration on Left face	  HEENT:    PERRL, EOMI	  Cardiovascular:  S1 S2, No JVD  Respiratory: Lungs clear to auscultation	  Gastrointestinal:  Soft, Non-tender, + BS	  Vascularature:  No edema of LE  Psychiatric: Appropriate affect   Neuro: no acute focal deficits                               8.6    11.49 )-----------( 270      ( 29 Jun 2022 10:05 )             26.7     06-29    147<H>  |  114<H>  |  29<H>  ----------------------------<  116<H>  4.1   |  25  |  1.48<H>    Ca    9.0      29 Jun 2022 10:05          Labs personally reviewed      ASSESSMENT/PLAN: 	    Ms. Meghna Claire is a 89 yo female with advanced dementia who lives at home with 24 hour care who presents to [facility] after fall at home. Found to have UTI.    Problem/Plan -1  Problem: Fall/Syncope  - Likely precipitated by UTI in elderly lady  - EKG with mild prolonged QTc on antipsychotics  - Repeat EKG  - CT head negative for hemorrhage or infarct  - IV ABx as per primary team  - Neurology following    Problem/Plan -2  Problem: Elevated Troponin  - Trop flat 106 --> 102  - EKG with no acute ischemic characteristics  - Likely demand secondary to acute infection    Problem/Plan -3  Problem: HTN  - c/w IV enalapril with hold parameters  - PO amlodipine initiated  - BP elevated: would DC amlodipine, restart home Nifedipine 60mg PO daily and losartan 50mg PO Daily (takes Irbesartan 75mg PO daily at home)        ANAYA Guadalupe-JAXON Ibarra DO [facility]  Cardiovascular Medicine  [address] [place] [address], [address]  Office: [phone]  Cell: [phone]

## 2022-07-01 NOTE — DISCHARGE NOTE PROVIDER - CARE PROVIDER_API CALL
Roque Santoyo)  Internal Medicine  [address] [place] [address] [id number]  Phone: [phone]  Fax: [phone]  Follow Up Time:

## 2022-07-01 NOTE — PROGRESS NOTE ADULT - SUBJECTIVE AND OBJECTIVE BOX
[facility]      Seen earlier today, and examined.  - Today, patient is without complaints.           *****MEDICATIONS: Current medication reviewed and documented.    MEDICATIONS  (STANDING):  dextrose 5% + lactated ringers. 1000 milliLiter(s) (75 mL/Hr) IV Continuous <Continuous>  losartan 50 milliGRAM(s) Oral daily  memantine 5 milliGRAM(s) Oral two times a day  multivitamin 1 Tablet(s) Oral daily  NIFEdipine XL 60 milliGRAM(s) Oral daily  rivaroxaban 10 milliGRAM(s) Oral daily  traZODone 100 milliGRAM(s) Oral at bedtime    MEDICATIONS  (PRN):  hydrALAZINE Injectable 10 milliGRAM(s) IV Push every 8 hours PRN SBP above 170  LORazepam     Tablet 0.5 milliGRAM(s) Oral two times a day PRN Agitation  valproate sodium  IVPB 125 milliGRAM(s) IV Intermittent every 8 hours PRN agitation          ***** VITAL SIGNS:  T(F): 97.9 (22 @ 04:23), Max: 98.1 (22 @ 20:46)  HR: 91 (22 @ 04:23) (60 - 91)  BP: 123/61 (22 @ 04:23) (123/61 - 176/77)  RR: 18 (22 @ 04:23) (18 - 18)  SpO2: 98% (22 @ 04:23) (94% - 98%)  Wt(kg): --  ,   I&O's Summary    2022 07:01  -  2022 07:00  --------------------------------------------------------  IN: 1540 mL / OUT: 0 mL / NET: 1540 mL             *****PHYSICAL EXAM:  sleeping   speech is clearer today      alert somewhat agitated   not following any commands   Gait not assessed.          *****LAB AND IMAGIN.4    15.09 )-----------( 265      ( 2022 07:12 )             27.2               07-01    141  |  107  |  19  ----------------------------<  106<H>  4.0   |  21<L>  |  1.25    Ca    8.9      2022 07:10                           [All pertinent recent Imaging/Reports reviewed]           *****A S S E S S M E N T   A N D   P L A N :      89 year old F with PMH HTN, DM (not on meds), dementia, DVT on eliquis BIBEMS after an unwitnessed fall at home. Aide heard her fall. Saw her on ground. Said she was lethargic immediately after. Patient arrived in C-collar. Laceration above R eyebrow. Cannot provide further history    aide at bedside reports that she has advanced dementia 24 hr aide   dependent of all adls  impulsive   ambulates with walker and help of aide   does sundown as per aide     Problem/Recommendations 1:  fall without any sig fracture  noted left scalp hematoma   will continue to monitor      Problem/Recommendations 2: ams   likely multifactorial, related to uti, fall worsening underlying dementia  currently on abx   ua +   urine culture pending   dc aricept due to worsening agitation   depakote prn for agitation/sundowning   will taper down ativan due to increased risk of falls.       sleep wake cycle regulation   as per daughter she is better today   ertapenem last day today   daughter is insisting to take her home       Thank you for allowing me to participate in the care of this patient. Will continue to follow patient periodically. Please do not hesitate to call me if you have any  questions or if there has been a change in patients neurological status     ________________  Urszula Funez MD  [facility]rological [facility] ([facility]  [phone]      33 minutes spent on total encounter; more than 50 % of the visit was  spent counseling about plan of care, compliance to diet/exercise and medication regimen and or  coordinating care by the attending physician.      It is advised that stroke patients follow up with JAXON Hayward @ [phone] in 1- 2 weeks.   Others please follow up with Dr. Michael Nissenbaum [phone]

## 2022-07-01 NOTE — DISCHARGE NOTE PROVIDER - NSDCCPCAREPLAN_GEN_ALL_CORE_FT
PRINCIPAL DISCHARGE DIAGNOSIS  Diagnosis: Acute UTI  Assessment and Plan of Treatment: HOME CARE INSTRUCTIONS  f you were prescribed antibiotics, take them exactly as your caregiver instructs you. Finish the medication even if you feel better after you have only taken some of the medication.  Drink enough water and fluids to keep your urine clear or pale yellow.  Avoid caffeine, tea, and carbonated beverages. They tend to irritate your bladder.  Empty your bladder often. Avoid holding urine for long periods of time.  After a bowel movement, women should cleanse from front to back. Use each tissue only once.  SEEK MEDICAL CARE IF:  You have back pain.  You develop a fever.  Your symptoms do not begin to resolve within 3 days.  SEEK IMMEDIATE MEDICAL CARE IF:  You have severe back pain or lower abdominal pain.  You develop chills.  You have nausea or vomiting.  You have continued burning or discomfort with urination.      SECONDARY DISCHARGE DIAGNOSES  Diagnosis: Fall  Assessment and Plan of Treatment: Fall precautions  Physical therapy

## 2022-07-01 NOTE — PROGRESS NOTE ADULT - SUBJECTIVE AND OBJECTIVE BOX
[facility], Division of Infectious Diseases  SEEMA Connor Y. Patel, S. Shah, G. Casimir  [phone]  ([phone] - weekdays after 5pm and weekends)    Name: NIYA FINNEGAN  Age/Gender: 89y Female  MRN: [id number]    Interval History:  Patient seen and examined this morning.   Patient awake, confused, denies pain.  Unable to obtain ROS d/t confusion.   Notes reviewed  No concerning overnight events  Afebrile   Allergies: penicillin (Unknown)  sulfa drugs (Unknown)      Objective:  Vitals:   T(F): 97.9 (07-01-22 @ 04:23), Max: 98.1 (06-30-22 @ 20:46)  HR: 91 (07-01-22 @ 04:23) (60 - 98)  BP: 123/61 (07-01-22 @ 04:23) (123/61 - 176/77)  RR: 18 (07-01-22 @ 04:23) (18 - 18)  SpO2: 98% (07-01-22 @ 04:23) (94% - 98%)  Physical Examination:  General: no acute distress  HEENT: NC, L facial bruising, anicteric, neck supple  Respiratory: clear to auscultation bilaterally  Cardiovascular: S1 and S2 present, normal rate   Gastrointestinal: soft, nontender, nondistended  Neuro: AAOx0, confused, asking who is in her house  Extremities: no edema, no cyanosis  Skin: no visible rash    Laboratory Studies:  CBC:                       8.4    15.09 )-----------( 265      ( 01 Jul 2022 07:12 )             27.2     WBC Trend:  15.09 07-01-22 @ 07:12  11.49 06-29-22 @ 10:05  11.78 06-28-22 @ 11:05  16.34 06-27-22 @ 04:20    CMP: 07-01    141  |  107  |  19  ----------------------------<  106<H>  4.0   |  21<L>  |  1.25    Ca    8.9      01 Jul 2022 07:10      Creatinine, Serum: 1.25 mg/dL (07-01-22 @ 07:10)  Creatinine, Serum: 1.48 mg/dL (06-29-22 @ 10:05)  Creatinine, Serum: 1.62 mg/dL (06-28-22 @ 11:05)  Creatinine, Serum: 2.09 mg/dL (06-27-22 @ 04:18)    Microbiology: reviewed   Culture - Urine (collected 06-27-22 @ 09:43)  Source: Catheterized Catheterized  Final Report (06-29-22 @ 08:19):    >100,000 CFU/ml Klebsiella pneumoniae ESBL  Organism: Klebsiella pneumoniae ESBL (06-29-22 @ 08:19)  Organism: Klebsiella pneumoniae ESBL (06-29-22 @ 08:19)      -  Amikacin: S <=16      -  Amoxicillin/Clavulanic Acid: R >16/8      -  Ampicillin: R >16 These ampicillin results predict results for amoxicillin      -  Ampicillin/Sulbactam: R >16/8 Enterobacter, Klebsiella aerogenes, Citrobacter, and Serratia may develop resistance during prolonged therapy (3-4 days)      -  Aztreonam: R >16      -  Cefazolin: R >16 (MIC_CL_COM_ENTERIC_CEFAZU) For uncomplicated UTI with K. pneumoniae, E. coli, or P. mirablis: DIANA <=16 is sensitive and DIANA >=32 is resistant. This also predicts results for oral agents cefaclor, cefdinir, cefpodoxime, cefprozil, cefuroxime axetil, cephalexin and locarbef for uncomplicated UTI. Note that some isolates may be susceptible to these agents while testing resistant to cefazolin.      -  Cefepime: R >16      -  Ceftriaxone: R >32 Enterobacter, Klebsiella aerogenes, Citrobacter, and Serratia may develop resistance during prolonged therapy      -  Ciprofloxacin: R 2      -  Ertapenem: S <=0.5      -  Gentamicin: R >8      -  Imipenem: S <=1      -  Levofloxacin: S <=0.5      -  Meropenem: S <=1      -  Nitrofurantoin: I 64 Should not be used to treat pyelonephritis      -  Piperacillin/Tazobactam: S <=8      -  Tigecycline: S <=2      -  Tobramycin: R >8      -  Trimethoprim/Sulfamethoxazole: R >2/38      Method Type: DIANA    Radiology: reviewed     Medications:  dextrose 5% + lactated ringers. 1000 milliLiter(s) IV Continuous <Continuous>  ertapenem  IVPB      ertapenem  IVPB 500 milliGRAM(s) IV Intermittent every 24 hours  hydrALAZINE Injectable 10 milliGRAM(s) IV Push every 8 hours PRN  LORazepam     Tablet 0.5 milliGRAM(s) Oral two times a day PRN  losartan 50 milliGRAM(s) Oral daily  memantine 5 milliGRAM(s) Oral two times a day  multivitamin 1 Tablet(s) Oral daily  NIFEdipine XL 60 milliGRAM(s) Oral daily  rivaroxaban 10 milliGRAM(s) Oral daily  traZODone 100 milliGRAM(s) Oral at bedtime  valproate sodium  IVPB 125 milliGRAM(s) IV Intermittent every 8 hours PRN    Antimicrobials:  ertapenem  IVPB      ertapenem  IVPB 500 milliGRAM(s) IV Intermittent every 24 hours

## 2022-07-01 NOTE — DISCHARGE NOTE NURSING/CASE MANAGEMENT/SOCIAL WORK - PATIENT PORTAL LINK FT
You can access the FollowMyHealth Patient Portal offered by [facility] by registering at the following website: [email or web address]. By joining [facility]’s FollowMyHealth portal, you will also be able to view your health information using other applications (apps) compatible with our system.

## 2022-07-01 NOTE — PROGRESS NOTE ADULT - PROVIDER SPECIALTY LIST ADULT
Cardiology
Infectious Disease
Internal Medicine
Neurology
Neurology
Infectious Disease
Internal Medicine
Neurology
Neurology
Infectious Disease
Infectious Disease

## 2022-07-01 NOTE — DISCHARGE NOTE NURSING/CASE MANAGEMENT/SOCIAL WORK - NSDCPEFALRISK_GEN_ALL_CORE
For information on Fall & Injury Prevention, visit: [email or web address] OR  [email or web address] OR  https://www.cdc.gov/steadi/patient.html

## 2022-07-01 NOTE — PROGRESS NOTE ADULT - SUBJECTIVE AND OBJECTIVE BOX
DATE OF SERVICE: 07-01-22 @ 11:19    Patient is a 89y old  Female who presents with a chief complaint of metabolic encephalopathy (01 Jul 2022 10:21)      INTERVAL HISTORY: Confused at baseline.     REVIEW OF SYSTEMS:  CONSTITUTIONAL: No weakness  EYES/ENT: No visual changes;  No throat pain   NECK: No pain or stiffness  RESPIRATORY: No cough, wheezing; No shortness of breath  CARDIOVASCULAR: No chest pain or palpitations  GASTROINTESTINAL: No abdominal  pain. No nausea, vomiting, or hematemesis  GENITOURINARY: No dysuria, frequency or hematuria  NEUROLOGICAL: No stroke like symptoms  SKIN: No rashes    	  MEDICATIONS:  hydrALAZINE Injectable 10 milliGRAM(s) IV Push every 8 hours PRN  losartan 50 milliGRAM(s) Oral daily  NIFEdipine XL 60 milliGRAM(s) Oral daily        PHYSICAL EXAM:  T(C): 36.6 (07-01-22 @ 04:23), Max: 36.7 (06-30-22 @ 20:46)  HR: 91 (07-01-22 @ 04:23) (60 - 98)  BP: 123/61 (07-01-22 @ 04:23) (123/61 - 176/77)  RR: 18 (07-01-22 @ 04:23) (18 - 18)  SpO2: 98% (07-01-22 @ 04:23) (94% - 98%)  Wt(kg): --  I&O's Summary    30 Jun 2022 07:01  -  01 Jul 2022 07:00  --------------------------------------------------------  IN: 1540 mL / OUT: 0 mL / NET: 1540 mL          Appearance: In no distress	  HEENT:    PERRL, EOMI, contusion on left face  Cardiovascular:  S1 S2, No JVD  Respiratory: Lungs clear to auscultation	  Gastrointestinal:  Soft, Non-tender, + BS	  Vascularature:  No edema of LE  Psychiatric: Appropriate affect   Neuro: no acute focal deficits                               8.4    15.09 )-----------( 265      ( 01 Jul 2022 07:12 )             27.2     07-01    141  |  107  |  19  ----------------------------<  106<H>  4.0   |  21<L>  |  1.25    Ca    8.9      01 Jul 2022 07:10          Labs personally reviewed      ASSESSMENT/PLAN: 	      Ms. Meghna Claire is a 89 yo female with advanced dementia who lives at home with 24 hour care who presents to [facility] after fall at home. Found to have UTI.    Problem/Plan -1  Problem: Fall/Syncope  - Likely precipitated by UTI in elderly lady  - EKG with mild prolonged QTc on antipsychotics  - Repeat EKG  - CT head negative for hemorrhage or infarct  - IV ABx as per primary team  - Neurology following    Problem/Plan -2  Problem: Elevated Troponin  - Trop flat 106 --> 102  - EKG with no acute ischemic characteristics  - Likely demand secondary to acute infection    Problem/Plan -3  Problem: HTN  - c/w IV enalapril with hold parameters  - PO amlodipine initiated  - BP elevated: would DC amlodipine, restart home Nifedipine 60mg PO daily and losartan 50mg PO Daily (takes Irbesartan 75mg PO daily at home)  - BP meds resumed, BP stabilizing    ANAYA Guadalupe-JAXON Ibarra DO [facility]  Cardiovascular Medicine  [address] [place] [address], [address]  Office: [phone]  Cell: [phone]

## 2022-07-01 NOTE — DISCHARGE NOTE PROVIDER - HOSPITAL COURSE
89 year old female with PMH HTN, DM (not on meds), dementia, DVT on eliquis BIBEMS after an unwitnessed fall at home.    Acute cystitis/UTI  with ESBL Klebsiella   Unwitnessed fall at home  L scalp laceration s/p repair  Leukocytosis likely d/t to above   H/o advanced dementia   H/o PCN and sulfa allergies - unknown reactions    - afebrile, laceration repaired in ER, WBC trended down/stable     - imaging reviewed, has L scalp hematoma, no collections/abscess, no pna   - Neurology consulted   - UA with pyuria - 48 WBC, large LE, many bacteria and +nitrites   - urine culture with ESBL Klebsiella, sensitivities noted    - continue ertapenem 500mg IV Q24h - complete total 5d course - end 7/1

## 2022-07-01 NOTE — DISCHARGE NOTE PROVIDER - REASON FOR ADMISSION
metabolic encephalopathy

## 2022-07-01 NOTE — DISCHARGE NOTE PROVIDER - NSDCMRMEDTOKEN_GEN_ALL_CORE_FT
apixaban 5 mg oral tablet: 1 tab(s) orally 2 times a day   irbesartan 75 mg oral tablet: 1 tab(s) orally once a day  levothyroxine 112 mcg (0.112 mg) oral tablet: 1 tab(s) orally once a day  LORazepam 0.5 mg oral tablet: 1 tab(s) orally 2 times a day, As needed, Agitation  memantine 5 mg oral tablet: 1 tab(s) orally 2 times a day  Multiple Vitamins oral tablet: 1 tab(s) orally once a day  NIFEdipine 60 mg oral tablet, extended release: 1 tab(s) orally once a day  traZODone 100 mg oral tablet: 1 tab(s) orally once a day

## 2022-07-01 NOTE — PROGRESS NOTE ADULT - REASON FOR ADMISSION
metabolic encephalopathy

## 2022-07-01 NOTE — PROGRESS NOTE ADULT - ASSESSMENT
Patient is a 89 year old female with PMH HTN, DM (not on meds), dementia, DVT on eliquis BIBEMS after an unwitnessed fall at home.    Acute cystitis/UTI  with ESBL Klebsiella   Unwitnessed fall at home  L scalp laceration s/p repair  Leukocytosis likely d/t to above   GORDO improved   H/o advanced dementia  H/o PCN and sulfa allergies - unknown reactions    - laceration repaired in ER, WBC trend noted, remains afebrile    - imaging reviewed, has L scalp hematoma, no collections/abscess, no pna   - Neurology following    - UA with pyuria - 48 WBC, large LE, many bacteria and +nitrites   - urine culture with ESBL Klebsiella, sensitivities noted    - continue ertapenem 500mg IV Q24h - complete total 5d course - end today 7/1   - monitor temps/WBC   - pain management per primary team   - aspiration precautions       Dr. Daniel White will be covering for our group from 7/2-7/4. If you have any questions please reach out to them at  [phone].    Nataly Elizabeth M.D.  [facility], Division of Infectious Diseases  [phone]  After 5pm on weekdays and all day on weekends - please call [phone]

## 2022-07-01 NOTE — DISCHARGE NOTE NURSING/CASE MANAGEMENT/SOCIAL WORK - NSDCVIVACCINE_GEN_ALL_CORE_FT
Tdap; 27-Jun-2022 05:11; Jc Cason (RN); [employer]; [id number] (Exp. Date: 18-Jan-2024); IntraMuscular; Deltoid Right.; 0.5 milliLiter(s); VIS (VIS Published: 09-May-2013, VIS Presented: 27-Jun-2022);

## 2022-07-01 NOTE — PROGRESS NOTE ADULT - SUBJECTIVE AND OBJECTIVE BOX
Patient is a 89y old  Female who presents with a chief complaint of metabolic encephalopathy (01 Jul 2022 13:34)      SUBJECTIVE / OVERNIGHT EVENTS: MS at baseline, completed ABx for ESBL UTI    MEDICATIONS  (STANDING):  dextrose 5% + lactated ringers. 1000 milliLiter(s) (75 mL/Hr) IV Continuous <Continuous>  losartan 50 milliGRAM(s) Oral daily  memantine 5 milliGRAM(s) Oral two times a day  multivitamin 1 Tablet(s) Oral daily  NIFEdipine XL 60 milliGRAM(s) Oral daily  rivaroxaban 10 milliGRAM(s) Oral daily  traZODone 100 milliGRAM(s) Oral at bedtime    MEDICATIONS  (PRN):  hydrALAZINE Injectable 10 milliGRAM(s) IV Push every 8 hours PRN SBP above 170  LORazepam     Tablet 0.5 milliGRAM(s) Oral two times a day PRN Agitation  valproate sodium  IVPB 125 milliGRAM(s) IV Intermittent every 8 hours PRN agitation      Vital Signs Last 24 Hrs  T(F): 97.4 (07-01-22 @ 13:39), Max: 98.1 (06-30-22 @ 20:46)  HR: 83 (07-01-22 @ 13:39) (60 - 91)  BP: 97/56 (07-01-22 @ 13:39) (97/56 - 176/77)  RR: 18 (07-01-22 @ 13:39) (18 - 18)  SpO2: 99% (07-01-22 @ 13:39) (94% - 99%)  Telemetry:   CAPILLARY BLOOD GLUCOSE        I&O's Summary    30 Jun 2022 07:01  -  01 Jul 2022 07:00  --------------------------------------------------------  IN: 1540 mL / OUT: 0 mL / NET: 1540 mL    01 Jul 2022 07:01  -  01 Jul 2022 15:59  --------------------------------------------------------  IN: 360 mL / OUT: 0 mL / NET: 360 mL        PHYSICAL EXAM:  GENERAL: NAD, well-developed  HEAD:  Atraumatic, Normocephalic  EYES: EOMI, PERRLA, conjunctiva and sclera clear  NECK: Supple, No JVD  CHEST/LUNG: Clear to auscultation bilaterally; No wheeze  HEART: Regular rate and rhythm; No murmurs, rubs, or gallops  ABDOMEN: Soft, Nontender, Nondistended; Bowel sounds present  EXTREMITIES:  2+ Peripheral Pulses, No clubbing, cyanosis, or edema  PSYCH: AAOx3  NEUROLOGY: non-focal  SKIN: No rashes or lesions    LABS:                        8.4    15.09 )-----------( 265      ( 01 Jul 2022 07:12 )             27.2     07-01    141  |  107  |  19  ----------------------------<  106<H>  4.0   |  21<L>  |  1.25    Ca    8.9      01 Jul 2022 07:10                RADIOLOGY & ADDITIONAL TESTS:    Imaging Personally Reviewed:    Consultant(s) Notes Reviewed:      Care Discussed with Consultants/Other Providers:

## 2022-07-07 NOTE — ASSESSMENT
[FreeTextEntry1] : neuropathy: advised to increase gabapentin to 200mg qhs\par POP:  referral to gyn previously provided, however after discussion today with dtr, as patient is asymptomatic, would recommend conservative management at this time. 55 year old female with no pertinent medical history presents to ED after tripping while putting a parking ticket under a windshield, striking her left chest, right elbow, and both knees. She relates that she went to see her orthopedist and got X-Rays done, but they did not do an X-Ray of her chest because they were unable to. Patient reports that the X-Rays of her elbow and knees were normal. She complains of moderate left chest pain and denies any pain before falling. Further denies shortness of breath. Says that it hurts to move. NKDA.  Allergies: morphine (pruritus)

## 2022-07-10 NOTE — ED ADULT NURSE NOTE - CINV DISCH TEACH PARTICIP
Chaperoned Dr. Alcantar as she performed a vaginal exam on pt who tolerated exam well  
NAD AT THIS TIME. AAOX4. RX AND D/C INFOMATION GIVEN TO REVIEWED WITH PT. PT DENIES ANY FURTHER NEEDS OR QUESTIONS. AMB OUT TO POV WITH STEADY GAIT.  
Patient

## 2022-07-22 PROBLEM — E03.9 HYPOTHYROIDISM: Status: ACTIVE | Noted: 2018-12-13

## 2022-07-22 PROBLEM — I82.409 DVT (DEEP VENOUS THROMBOSIS): Status: ACTIVE | Noted: 2021-09-08

## 2022-07-22 PROBLEM — R26.81 UNSTEADY GAIT: Status: ACTIVE | Noted: 2018-12-13

## 2022-07-22 PROBLEM — N18.30 CKD (CHRONIC KIDNEY DISEASE), STAGE III: Status: ACTIVE | Noted: 2019-08-26

## 2022-07-22 PROBLEM — E11.9 DIABETES TYPE 2, CONTROLLED: Status: ACTIVE | Noted: 2019-06-06

## 2022-07-22 NOTE — PHYSICAL EXAM
[General Appearance - Alert] : alert [General Appearance - In No Acute Distress] : in no acute distress [Sclera] : the sclera and conjunctiva were normal [Extraocular Movements] : extraocular movements were intact [No Oral Pallor] : no oral pallor [Both Tympanic Membranes Were Examined] : both tympanic membranes were normal [Neck Appearance] : the appearance of the neck was normal [] : no respiratory distress [Respiration, Rhythm And Depth] : normal respiratory rhythm and effort [Exaggerated Use Of Accessory Muscles For Inspiration] : no accessory muscle use [Auscultation Breath Sounds / Voice Sounds] : lungs were clear to auscultation bilaterally [Heart Sounds] : normal S1 and S2 [Bowel Sounds] : normal bowel sounds [Abdomen Soft] : soft [Abdomen Tenderness] : non-tender [Cervical Lymph Nodes Enlarged Posterior Bilaterally] : posterior cervical [Cervical Lymph Nodes Enlarged Anterior Bilaterally] : anterior cervical [Supraclavicular Lymph Nodes Enlarged Bilaterally] : supraclavicular [Nail Clubbing] : no clubbing  or cyanosis of the fingernails [Involuntary Movements] : no involuntary movements were seen [Musculoskeletal - Swelling] : no joint swelling seen [No Focal Deficits] : no focal deficits [Affect] : the affect was normal [Mood] : the mood was normal [de-identified] : right shin with scaly lesion without signs of infection, left forearm with raised red indurated with scab on top, without surround erythema  [FreeTextEntry1] : slow unsteady gait, no tendnerness to palpation of hips, knees or thigs.

## 2022-07-22 NOTE — HISTORY OF PRESENT ILLNESS
[FreeTextEntry1] : 89y/o female. PMH of dementia, CKD, , DM (not on medications), Hypothyroidism, anemia of CKD seen for follow up.\par \par fall 6/28/22 with stitches to left temporal area- removed last friday by derm\par \par saw renal last week was given iron increased\par and stopped arb due to hypotension\par f/u bloodwork by renal in 2 weeks\par \par Dementia with Behavioral disturbances:\par mainly with sundowning and sleeping disturbances with paranoia and anxiety. hx of increased daytime sleepiness with Seroquel.\par sleeping disturbances improved on  trazodone\par \par \par hospitalized to [facility] on 8/27/21. Was referred to go ther by the podiatry clinic secondary to a wound on her toe, left 1st toe found with a wound and drainage, in addition it was found for her to have an acute DVT of the left external iliac, common femoral and femoral veins. Was initially started on heparin ggt and transitioned to Eliquis for a total duration of 3-6 months. has f/u with dr. Dr. Manjarrez vascular \par \par \par Care Team:\par Dtr iGn comes over daily\par has HHA 24/7 supervision \par \par ADL's/IADL's:\par Requires total assist needed aside from feeding.\par \par Continence:\par both UI and fecal incontinence.\par no skin breakdown\par \par pelvic prolapse: denies pain, bleeding or vaginal discharge\par \par Appetite:\par reduced po intake\par \par functional Status:\par walks with walker. denies recent falls\par \par hypothyroidism: adherent with medication\par \par

## 2022-11-18 PROBLEM — Z23 ENCOUNTER FOR IMMUNIZATION: Status: ACTIVE | Noted: 2020-11-06

## 2022-11-18 PROBLEM — R10.9 ABDOMINAL PAIN: Status: ACTIVE | Noted: 2022-01-01

## 2022-11-18 PROBLEM — F03.90 DEMENTIA: Status: ACTIVE | Noted: 2018-12-13

## 2022-11-18 PROBLEM — Z71.89 ADVANCE CARE PLANNING: Status: ACTIVE | Noted: 2020-08-07

## 2022-11-18 PROBLEM — I10 BENIGN ESSENTIAL HTN: Status: ACTIVE | Noted: 2018-12-13

## 2022-11-18 PROBLEM — D64.9 ANEMIA: Status: ACTIVE | Noted: 2019-08-26

## 2022-11-18 PROBLEM — E46 MALNUTRITION: Status: ACTIVE | Noted: 2022-01-01

## 2022-11-18 NOTE — PHYSICAL EXAM
[General Appearance - Alert] : alert [General Appearance - In No Acute Distress] : in no acute distress [Extraocular Movements] : extraocular movements were intact [No Oral Pallor] : no oral pallor [Both Tympanic Membranes Were Examined] : both tympanic membranes were normal [Neck Appearance] : the appearance of the neck was normal [] : no respiratory distress [Exaggerated Use Of Accessory Muscles For Inspiration] : no accessory muscle use [Heart Sounds] : normal S1 and S2 [Bowel Sounds] : normal bowel sounds [Abdomen Soft] : soft [Abdomen Tenderness] : non-tender [Cervical Lymph Nodes Enlarged Posterior Bilaterally] : posterior cervical [Cervical Lymph Nodes Enlarged Anterior Bilaterally] : anterior cervical [Supraclavicular Lymph Nodes Enlarged Bilaterally] : supraclavicular [Nail Clubbing] : no clubbing  or cyanosis of the fingernails [Musculoskeletal - Swelling] : no joint swelling seen [No Focal Deficits] : no focal deficits [Affect] : the affect was normal [Mood] : the mood was normal [Alert] : alert [Sclera] : the sclera and conjunctiva were normal [No Respiratory Distress] : no respiratory distress [No Acc Muscle Use] : no accessory muscle use [Respiration, Rhythm And Depth] : normal respiratory rhythm and effort [Auscultation Breath Sounds / Voice Sounds] : lungs were clear to auscultation bilaterally [Normal S1, S2] : normal S1 and S2 [Heart Rate And Rhythm] : heart rate was normal and rhythm regular [Involuntary Movements] : no involuntary movements were seen [Normal Insight/Judgment] : insight and judgment were not intact [de-identified] : not oriended to self place or time. Falling asleep initiall;y during evaluation. Became more restless at the end of the exam. Cachectic appearing [de-identified] : tender abdomen [de-identified] : no skin rashes or lacerations noted. Small scab on the mid nasal bridge.  [de-identified] : unable to assess as pt does not follow commands  [de-identified] : severe dementia  [FreeTextEntry1] : slow unsteady gait, no tendnerness to palpation of hips, knees or thigs.

## 2022-11-18 NOTE — HISTORY OF PRESENT ILLNESS
[Completely Dependent] : Completely dependent. [FreeTextEntry1] : 89y/o female. PMH of dementia, CKD, , DM (not on medications), Hypothyroidism, anemia of CKD seen for follow up.\par Patient is accompanied by daughter as well. She reports that pt is stil restless at time and is on lorazepam 0.5mg as needed which helps. \par \par fall 6/28/22 with stitches to left temporal area. No falls since then. \par \par Dementia with Behavioral disturbances:  overall worse, progression of disease\par mainly with sundowning and sleeping disturbances with paranoia and anxiety. hx of increased daytime sleepiness with Seroquel.\par  Currenlty on Lorazepam only. \par \par hospitalized to [facility] on 8/27/21. it was found for her to have an acute DVT of the left external iliac, common femoral and femoral veins. Was initially started on heparin ggt and transitioned to Eliquis for a total duration of 3-6 months. Recent labs show worsening CKD, bmi is 18 and because of patients age discussed with daughter to decrease the dose of Eliquis and likely discontinue it soon after. \par HTN: On nifedipine 30mg daily but her BP well controlled. In the low 100s. BP is regularly checked at home and in the lower 100s at home as well. Discussed with daughter that she may not need BP meds any longer and to continue monitoring BP daily. If BP >150 to give nifedipine. \par Care Team:\par Dtr Gin comes over daily\par has A 24/7 supervision \par \par ADL's/IADL's:\par Requires total assist \par \par Continence:\par both UI and fecal incontinence.\par no skin breakdown\par \par \par Appetite: Weight loss and decreased appetite. Protein malnutrition. \par \par functional Status:\par walks with walker. denies recent falls since last evaluation\par \par hypothyroidism: adherent with medication on synthroid\par \par

## 2022-11-18 NOTE — END OF VISIT
[] : Fellow [FreeTextEntry3] : 89yoF with progression of dementia with worsening BD, weight loss and protein malnutrition.  hospice referral for evaluation.

## 2023-01-01 ENCOUNTER — TRANSCRIPTION ENCOUNTER (OUTPATIENT)
Age: 88
End: 2023-01-01

## 2023-01-01 ENCOUNTER — NON-APPOINTMENT (OUTPATIENT)
Age: 88
End: 2023-01-01

## 2023-01-01 RX ORDER — APIXABAN 2.5 MG/1
2.5 TABLET, FILM COATED ORAL
Qty: 60 | Refills: 10 | Status: ACTIVE | COMMUNITY
Start: 2022-02-28 | End: 1900-01-01

## 2023-08-02 NOTE — SWALLOW BEDSIDE ASSESSMENT ADULT - CONSISTENCIES ADMINISTERED
MEDICARE WELLNESS VISIT NOTE    HISTORY OF PRESENT ILLNESS:   Harshal Leavitt presents for his Subsequent Annual Medicare Wellness Visit.     He has significant peripheral artery disease, coronary artery disease.  He is now following with Dr. Thompson, Cardiologist.  He recently had complaints of some orthostatic hypotension, has increased his fluid intake and decreased his hydrochlorothiazide dose to 25 mg as directed by Dr. Thompson.  He does feel improvement in symptoms.  He is also on metoprolol 100 mg and lisinopril 40 mg.  His atorvastatin was recently increased to 80 mg daily.    He has no acute concerns today.  Does report that since intermittent on his left lower extremity he does have slightly more swelling in that leg.  He also has significant peripheral arterial disease in both of his legs and so does get intermittent claudication, but does feel that this is tolerable at current point.    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     Continues to be an everyday tobacco user, does smoke 5 to 6+ cigarettes a day. No immediate plans to stop.      Patient Care Team:  Dina Day MD as PCP - General (Internal Medicine)  Adele Moe MD (Gastroenterology)  Chasidy Remy MD as Dermatology (Dermatology)  Saman Shah MD as Urologist (Urology)  Heriberto Castanon MD as Radiology (Radiology-Diagnostic Radiology)  dr beyer @ [facility]  as Dentist- General Practice  [employer] as Optometry        Patient Active Problem List   Diagnosis   • AAA (abdominal aortic aneurysm) without rupture (CMD)   • Essential hypertension, benign   • Other and unspecified hyperlipidemia   • Diverticulosis of colon   • Benign neoplasm of colon   • Bilateral shoulder pain   • Personal history of tobacco use, presenting hazards to health   • PAD (peripheral artery disease) (CMD)   • Malignant neoplasm of lateral wall of urinary bladder (CMD)    • Bilateral sensorineural hearing loss   • DDD (degenerative disc disease), lumbar   • Open leg wound, left, initial encounter   • Venous ulcer of left lower extremity without varicose veins (CMD)   • Coronary artery calcification         Past Medical History:   Diagnosis Date   • AAA (abdominal aortic aneurysm) (CMD)     IR in [place]   • Aortoiliac occlusive disease (CMD)     Left   • Benign neoplasm of colon 11/02/2015   • Bilateral shoulder pain 12/07/2015   • Cataract    • COPD (chronic obstructive pulmonary disease) (CMD)    • DDD (degenerative disc disease), lumbar 07/25/2018   • Diverticulosis of colon 11/02/2015   • Emphysema of lung (CMD)    • Esophageal reflux disease    • HTN (hypertension)    • Hypercholesterolemia    • Keratosis, actinic    • Myocarditis (CMD)    • Open leg wound, left, initial encounter 12/13/2022   • PAD (peripheral artery disease) (CMD) 06/12/2017    Based on CT angio 2012: The right external iliac artery shows diffuse atherosclerosis but no significant stenosis.  The right common femoral artery shows mild to moderate atherosclerotic changes but no significant stenosis.  The left external iliac artery shows mild diffuse atherosclerosis but no significant stenosis.  The left common femoral artery shows mild to moderate atherosclerotic changes w   • Personal history of tobacco use, presenting hazards to health 06/12/2017   • Rotator cuff injury     left  sp repair   • S/P right shoulder scope/tenotomy of the LHBT/SAD/RC repair - 2/22/16 02/24/2016   • Wears partial dentures     upper partial   • Wears prescription eyeglasses          Past Surgical History:   Procedure Laterality Date   • Abdominal aortic aneurysm repair, endovascular  2010   • Abdominal aortic aneurysm repair, open  2010   • Colonoscopy  11/02/2015    repeat in 5 years-Abhi   • Eye surgery Bilateral     RK   • Hernia repair Right     inguinal   • Rotator cuff repair Bilateral    • Skin lesion excision Left  2016    benign , left leg  Dr Najera   • Thromboendart w/w0 graft carotid  neck incs N/A 2023   • Thromboendart w/w0 graft carotid  neck incs N/A 2023   • Vascular surgery     • Vasectomy           Social History     Tobacco Use   • Smoking status: Some Days     Current packs/day: 0.00     Average packs/day: 1 pack/day for 58.0 years (58.0 ttl pk-yrs)     Types: Cigarettes     Start date: 1964     Last attempt to quit:      Years since quittin.5   • Smokeless tobacco: Never   • Tobacco comments:     22 approx  ppd   Vaping Use   • Vaping Use: Former   Substance Use Topics   • Alcohol use: Yes     Alcohol/week: 10.0 standard drinks of alcohol     Types: 10 Standard drinks or equivalent per week     Comment: 10 per week   • Drug use: No     Drug use:    Drug Use:    No              Family History   Problem Relation Age of Onset   • Hypertension Mother    • Heart disease Father    • Diabetes Sister    • Obesity Sister        Current Outpatient Medications   Medication Sig Dispense Refill   • atorvastatin (LIPITOR) 80 MG tablet Take 1 tablet by mouth daily. 90 tablet 1   • hydrochlorothiazide (HYDRODIURIL) 50 MG tablet Take 1 tablet by mouth daily. 90 tablet 0   • metoPROLOL succinate (TOPROL-XL) 100 MG 24 hr tablet Take 1 tablet by mouth daily. 90 tablet 3   • clopidogrel (PLAVIX) 75 MG tablet Take 1 tablet by mouth daily. 30 tablet 5   • aspirin (ECOTRIN) 81 MG EC tablet Take 81 mg by mouth daily.     • lisinopril (ZESTRIL) 40 MG tablet Take 1 tablet by mouth daily. 90 tablet 1   • diphenhydrAMINE (BENADRYL) 50 MG capsule Take 50 mg by mouth every 6 hours as needed for Sleep.     • Multiple Vitamins-Minerals (Multivitamin Adults 50+) Tab Take 1 tablet by mouth.       No current facility-administered medications for this visit.        The following items on the Medicare Health Risk Assessment were found to be positive  4.) During the past 4 weeks, what was the hardest physical  activity you could do for at least 2 minutes?: Light     5.) Do you do moderate to strenuous exercise (brisk walk) for about 20 minutes for 3 or more days per week?: No, I usually do not exercise this much     6 b.) How many servings of High Fiber / Whole Grain Foods to you have each day ( 1 serving = 1 cup cold cereal, 1/2 cup cooked cereal, 1 slice bread): 1 per day     6 c.) How many servings of Fried or High Fat Foods do you have each day (1 serving = 1 Antony, French Fries, chips, doughnut, fried chicken/fish): 2 per day     7b.) Do you feel unsteady when standing or walking?: Yes     7c.) Do you worry about falling?: Yes       He is aware of diet recommendations and avoiding fried/fatty foods and increased fiber. Discussed falling concerns, states he usually is less steady early in the morning before he limbers up.     Vision and Hearing screens: Not applicable    Advance care planning documents on file - yes     Cognitive/Functional Status: no evidence of cognitive dysfunction by direct observation    Opioid Review: Harshal is not taking opioid medications.    Recent PHQ 2/9 Score:    PHQ 2:  PHQ 2 Score Adult PHQ 2 Score Adult PHQ 2 Interpretation Little interest or pleasure in activity?   8/2/2023   1:43 PM 0 No further screening needed 0       PHQ 9:       DEPRESSION ASSESSMENT/PLAN:  Depression screening is negative no further plan needed.     Body mass index is 22.16 kg/m².   Vitals:    08/02/23 1344   BP: 112/70   Pulse: 73   Physical Exam  Constitutional:       Appearance: Normal appearance.   HENT:      Right Ear: Tympanic membrane normal.      Left Ear: Tympanic membrane normal.      Mouth/Throat:      Mouth: Mucous membranes are moist.      Pharynx: Oropharynx is clear.   Eyes:      Extraocular Movements: Extraocular movements intact.      Pupils: Pupils are equal, round, and reactive to light.   Cardiovascular:      Rate and Rhythm: Normal rate and regular rhythm.      Heart sounds: Normal heart  sounds.   Pulmonary:      Effort: Pulmonary effort is normal.      Breath sounds: Normal breath sounds.   Musculoskeletal:      Right lower leg: No edema.      Left lower leg: Edema (minor, non-pitting) present.   Skin:     Capillary Refill: Capillary refill takes less than 2 seconds.   Neurological:      General: No focal deficit present.      Mental Status: He is alert.   Psychiatric:         Mood and Affect: Mood normal.         Behavior: Behavior normal.         BMI ASSESSMENT/PLAN:  Patient BMI is within normal range.     ASSESSMENT/PLAN:  Essential hypertension, benign  Following with cardiology on reduced dose of hydrochlorothiazide at 25 mg daily, continues on lisinopril 40 mg daily, metoprolol 100 mg daily    PAD (peripheral artery disease) (CMS/HCC)  On antiplatelet therapy and atorvastatin 80    Coronary artery calcification  Follows closely with Cardiology    Personal history of tobacco use, presenting hazards to health  No immediate plans for cessation    See orders.   See Patient Instructions section.   Return in about 1 year (around 8/2/2024) for MWV.      limited trials accepted, patient increasingly combative/thin liquid/soft & bite-sized

## 2023-09-07 NOTE — ED ADULT NURSE NOTE - SEPSIS REFERENCE DATA CRITERIA 2
----- Message from Cirilo Whittaker MD sent at 9/7/2023 12:11 PM CDT -----  Contact: [phone]  No she needs follow-up but does not need  ----- Message -----  From: Eva Burdick LPN  Sent: 9/7/2023  11:26 AM CDT  To: Cirilo Whittaker MD    Patient is concerned about labs completed in August. She missed her follow up. Message was sent to scheduling to assist. Patient wants to know id she should report to ER for kidney levels. Please advise.  ----- Message -----  From: Blanquita Esquivel  Sent: 9/7/2023  10:16 AM CDT  To: [facility] Staff      Patient: Jaswant Yang-    Reason: The patient is requesting a call back from the nurse to get assistance with scheduling an     appointment for follow-up.     Comments: Please call the patient back to advise.            Abnormal Lactate: > 2

## 2023-09-08 NOTE — PHYSICAL THERAPY INITIAL EVALUATION ADULT - LEVEL OF INDEPENDENCE: SUPINE/SIT, REHAB EVAL
Developmental and Behavioral Pediatrics Specialty Follow Up       Assessment and Plan:    ADHD (attention deficit hyperactivity disorder), combined type  -     methylphenidate (Ritalin) 5 mg tablet; Take 1 tablet (5 mg total) by mouth 3 (three) times a day with meals Max Daily Amount: 15 mg    Discussed mother's observations regarding medication effectiveness, noting significant behavior control difficulties in exam room today compared to what she sees on daily basis; given stability reported in general recommended maintaining at present dosing regimen but monitoring closely when new school year starts focusing and impulse control. Sent prescription for 5 mg tablet after review of PDMP. Mixed receptive-expressive language disorder  Discused ongoing improvement seen though agree with benefits of continued therapy including to assist with early literacy skills; appreciate team approach coming up as well to address social pragmatic aspects of communication and interaction. Praised ongoing school attendance and requested copy of Individualized Education Plan (IEP) for or review. Delayed milestone in childhood  Noted ongoing efforts with toilet training and reviewed stages of skills needed, including the last and most important one of caring whether wet or dirty. Discussed hopeful benefits of again watching peers during school day during toilet time for class and would consider reward system for coming home dry and clean though need to monitor for development of constipation secondary to stool-holding. Sleep trouble  -     cloNIDine (Catapres) 0.1 mg tablet; Take 0.5-2 tablets (0.05-0.2 mg total) by mouth daily at bedtime Titration:  1/2 - 2 tablet 60 minutes before bedtime. May repeat dose 4-6 hours later for excessive insomnia. Discussed at length sleep difficulties in young children, with increased prevalence in ADHD especially for sleep onset.   Applauded elimination of all electronics before bedtime but noted goal of 10-12 hours per night of sleep, with more needed if not napping during daytime. Noted good sleep once able to fall asleep and discussed benefits of trial of clonidine to assist, noting its elimination from body within 4-6 hours and therefore unlikely to cause any significant morning fatigue. Also noted, for that reason, ability to repeat overnight if needed as long as not too close to anticipated time of waking. Mother in agreement with plan, and prescription sent to pharmacy and medication handout given to mother with titration instructions. Keep follow up with Dr. Mayda Hurt on 11/28/23      Mikala Kahn is a 3 y.o. 4 m.o. male seen at [address] [place] [address] for follow up of ADHD. He is also seen for language and developmental delays. He is currently taking Ritalin at 5 mg three times a day. 1. Medication Plan:  He is to continue Ritalin at present regimen and add clonidine for sleep onset difficulties. Refill: Yes, refill for Ritalin sent to pharmacy, along with script for the clonidine. Prescription Policy signed for Developmental and Behavioral Pediatrics [facility] [place] : May 19, 2023     We have reviewed risks, benefits and side effects of medications, and that medicine works best in combination with educational and behavioral treatments. We reviewed FDA approval, black box status and risks of medicine interactions. After discussion of these issues, mother has consented to the medication as noted. 2. Laboratory monitoring is not required. 3. Behavior interventions: On waiting list for [facility]. Follow-up Plan:?   1. We discussed the importance of routine follow-up for children taking medicine. This is to make sure medicine is still working and to monitor for side effects. 2. Recommended follow-up : 60 minute provider medication management visit in this clinic in 2-3 months (already scheduled with Dr. Mayda Hurt for 11/28/23).   3. Our main office at [phone]  4. Refills: Please call 7-10 days before needing a refill. Thank you for allowing us to take part in your child's care. Please call if there are any questions or concerns. Please provide us with any feedback on your visit today, We want to continue to improve communication and interactions with you and other patients that visit this clinic. I spent 50 minutes today caring for Shan Brandee which included the following activities: preparing for the visit, obtaining the history, performing an exam, counseling mother, placing orders, and providing medication handout with titration instructions. Gill Soto MD, [address] [place] [address]/[id number]/[id number]  Board Certified, Developmental-Behavioral Pediatrics      HPI:  Bartolome Cuevas is a 3 y.o. 4 m.o. male seen at [address] [place] [address] for follow up of ADHD. He is also seen for language and developmental delays. He is currently taking Ritalin at 5 mg three times a day. Shan Brandee was last seen in our office in May by my colleague, Dr. Thang Cook; he was accompanied today by his mother who was the primary historian. He is awaiting the start of the new school year for his [employer] program as well as for the [employer] to start up again to receive speech therapy; he is now in private speech therapy twice weekly through [facility] and was just accepted this week for private occupational therapy, with plans to change to a group therapy session later this month to work on sharing and turn taking. Mom feels [facility] is making "great" progress overall, including showing improvement in toileting; he loves looking at books and has reportedly taught himself to read. He has also been taking swim lessons through a program in [place], Lilli [place]. Mom notes the Ritalin is working well with the current dosing pattern of 5 mg at 7:30, 1:30, and 4:30 though she may have to hide the medication in food as he has started to refuse the dose.   Mom notes a decrease in appetite on the medication as well as ongoing food selectivity though no aversion. Flores Cormier has had some difficulties with sleep onset and may stay up playing with blocks or looking at books in his room. Mom finds that, if he can fall asleep, he sleeps "solid" through the night and still wakes up dry. Flores Cormier can still be impulsive at times, including falling off a couch a couple months ago while doing a handstand and striking his head on the wooden coffee table; he was seen in the ED though no head CT was felt warranted. Side Effects: The family reports NO side effects of :  headaches, abdominal pain, fatigue, tics, mood changes, perserveration and aggression. There have been some side effects of appetite suppression. Medical Supplies : Diapers / pull-ups      School:  He is in . Name of school: [employer]  School district : [facility] [employer]: [place]  Family did not bring in a copy of his most recent IEP; receiving therapy, special education    Outpatient therapy:  [facility]:  Occupational and speech therapy    Behavioral services:   On waiting list for [facility]    Other Therapy/extracurricular activities: Swimming      ROS:  As Per HPI  Pertinent positives:  Recent head injury, now resolved; sleep problems, not yet toilet trained      Family History   Problem Relation Age of Onset   • Hypertension Mother         Copied from mother's history at birth   • Mental illness Mother         Copied from mother's history at birth   • ADD / ADHD Mother    • Obesity Mother    • Vision loss Mother    • Hearing loss Father    • OCD Father    • Vision loss Father    • Vision loss Sister    • Vision loss Sister    • Diabetes Maternal Grandmother         type 2 (Copied from mother's family history at birth)   • COPD Maternal Grandmother         Copied from mother's family history at birth   • Anxiety disorder Maternal Grandmother         Copied from mother's family history at birth   • Hypertension Maternal Grandfather         Copied from mother's family history at birth   • Autism spectrum disorder Cousin        Social History     Socioeconomic History   • Marital status: Single     Spouse name: Not on file   • Number of children: Not on file   • Years of education: Not on file   • Highest education level: Not on file   Occupational History   • Not on file   Tobacco Use   • Smoking status: Never     Passive exposure: Never   • Smokeless tobacco: Never   Substance and Sexual Activity   • Alcohol use: Not on file   • Drug use: Not on file   • Sexual activity: Not on file   Other Topics Concern   • Not on file   Social History Narrative    -Macario Hassan lives with his biological parents Derik Mckenzie and Darryn Schwartz and his two siblings Yeyo aCmpos and Yumiko Fernando.         -Parental marital status:     -Parent Information-Mother: Name: Derik Mckenzie, Education Level completed: [address], Occupation: [employer], Part-time    -Parent Information-Father: Name: Darryn Schwartz, Education Level completed: High School Graduate, Occupation: [address], Full-time        -Are their pets in the home? yes Type: 2 cats and guinea pig    -Are their handguns in the home? no         As of [id number]    [facility]ENTER: [facility]: Maribel Hickey Name: [employer], starts again on 9/27/2023    Grade: Pre-K, he attends 5 days a 2 week from 7:30 - 1 pm    Macario Hassan does have an Individualized Education Plan (IEP), he receives speech therapy and special education. Outpatient Therapy: Speech therapy 2x per week at [facility], started Occupational Therapy         IBHS: on waiting list with [facility].  Unsure if they need the hours now but school still says it could be helpful     Social Determinants of Health     Financial Resource Strain: Not on file   Food Insecurity: Not on file   Transportation Needs: Not on file   Physical Activity: Not on file   Housing Stability: Not on file       Physical Exam:    Vitals:    09/08/23 1511   BP: (!) 88/60   Pulse: 90   Resp: 20   Weight: 16.8 kg (37 lb)   Height: 3' 4.63" (1.032 m)       Constitutional: Patient appears well-developed and well-nourished. Has gained 3/4 of a pound since May visit. HEENT: No apparent pain with palpation. Nose: No nasal congestion  Mouth/Throat: Oropharynx is clear. Eyes: EOMI.no nystagmus   Cardiovascular: RRR; S1 and S2 heard. does not have a murmur, No rubs or gallops   Pulmonary/Chest: Breath sounds CTA to b/l bases. Abdominal: Soft. Non-tender, nondistended, good bowel sounds  Musculoskeletal: full range of motion upper and lower extremities b/l and symmetric   Neurological:  Patient is alert.  No tics or tremors ; DTRs: 2+ bilaterally, gait :Normal.  Mental status/mood:  is cooperative with exam, good eye contact   Attention/Concentration/Activity level:  High activity level, including up and down from chair, climbing on furniture, and standing on table maximum assist (25% patients effort)

## 2023-12-28 NOTE — SWALLOW BEDSIDE ASSESSMENT ADULT - COMMENTS
History continued:  Neurology consult for AMS-->aide at bedside reports that she has advanced dementia 24 hr aide, dependent of all adls, impulsive, ambulates with walker and help of aide, does sundown as per aide. AMS likely multifactorial, related to uti, fall worsening underlying dementia, currently on abx   ua +.   Infectious disease following for UTI-->Acute cystits/UTI, L scalp laceration, Unwitnessed fall at home, Leukocytosis likely d/t to above.   Cardiology consult for Fall-->Fall/syncope -- likely precipitated by UTI in elderly lady  6/28 wbc: 11.78    6/27 CXR IMPRESSION: No focal consolidation. No pneumothorax.  Head CT: No acute intracranial hemorrhage, mass effect, or midline shift. Left scalp hematoma extends inferiorly up to the left cheek without underlying acute osseous abnormalities.  Cervical Spine CT: No acute fracture. Cervical degenerative changes results in varying degrees of neural foraminal and spinal canal narrowing.    SWALLOW HISTORY: No reports in SCM or in PACS prior to this admission. 97
